# Patient Record
Sex: FEMALE | Race: WHITE | NOT HISPANIC OR LATINO | Employment: FULL TIME | URBAN - METROPOLITAN AREA
[De-identification: names, ages, dates, MRNs, and addresses within clinical notes are randomized per-mention and may not be internally consistent; named-entity substitution may affect disease eponyms.]

---

## 2012-12-22 LAB
COMPONENT (HISTORICAL CONVERSION): NORMAL
CROSSMATCH: NORMAL
STATUS (HISTORICAL CONVERSION): NORMAL
UNIT NUMBER: NORMAL

## 2018-09-18 ENCOUNTER — TELEPHONE (OUTPATIENT)
Dept: DERMATOLOGY | Facility: CLINIC | Age: 48
End: 2018-09-18

## 2018-09-18 NOTE — TELEPHONE ENCOUNTER
[facility] Call Center    Phone Message    May a detailed message be left on voicemail: yes    Reason for Call: Other: Pt is wanting to know if her referral to Dr. Allison has been received. She states her OB/Gyn, Dr. Lai, is referring her-however, she is also being referred by [facility]. She is being referred for hair loss. No documentation in chart-please verify if received, pt is anxious to schedule.    Action Taken: Message routed to:  [facility] ([facility] CC Derm Vasc

## 2018-10-18 ENCOUNTER — TRANSFERRED RECORDS (OUTPATIENT)
Dept: HEALTH INFORMATION MANAGEMENT | Facility: CLINIC | Age: 48
End: 2018-10-18

## 2019-01-02 ENCOUNTER — MEDICAL CORRESPONDENCE (OUTPATIENT)
Dept: HEALTH INFORMATION MANAGEMENT | Facility: CLINIC | Age: 49
End: 2019-01-02

## 2019-01-04 ENCOUNTER — PRE VISIT (OUTPATIENT)
Dept: DERMATOLOGY | Facility: CLINIC | Age: 49
End: 2019-01-04

## 2019-01-04 NOTE — TELEPHONE ENCOUNTER
FUTURE VISIT INFORMATION      FUTURE VISIT INFORMATION:    Date: N/A    Time:     Location:   REFERRAL INFORMATION:    Referring provider:  DR. EDY SIMMONS    Referring providers clinic:  [facility]    Reason for visit/diagnosis  HAIR LOSS      DATE RECEIVED: 1/3/2019   NOTES STATUS DETAILS   OFFICE NOTE from referring provider Received    OFFICE NOTE from other specialist N/A    SCALP BIOPSY RESULTS N/A    HAIR LOSS PHOTOS Received    LAB RESULTS N/A    MEDICATION LIST N/A      Action    Action Taken RECEIVED RECORDS WILL BE REVIEWED BY DR. TORRES WITHIN FOUR TO SIX WEEKS. PATIENT WILL BE CONTACTED.

## 2019-01-18 ENCOUNTER — HOSPITAL ENCOUNTER (OUTPATIENT)
Dept: MAMMOGRAPHY | Facility: CLINIC | Age: 49
Discharge: HOME | End: 2019-01-18
Attending: OBSTETRICS & GYNECOLOGY

## 2019-01-18 DIAGNOSIS — Z12.31 VISIT FOR SCREENING MAMMOGRAM: ICD-10-CM

## 2019-02-05 NOTE — TELEPHONE ENCOUNTER
Action    Action Taken RECORDS WERE REVIEWED AND PATIENT HAS BEEN ACCEPTED INTO DR. TORRES[facility]S [facility]. PATIENT MAY SCHEDULE AN [facility] APPOINTMENT.

## 2019-07-01 ENCOUNTER — OFFICE VISIT (OUTPATIENT)
Dept: DERMATOLOGY | Facility: CLINIC | Age: 49
End: 2019-07-01
Payer: COMMERCIAL

## 2019-07-01 VITALS — DIASTOLIC BLOOD PRESSURE: 59 MMHG | HEART RATE: 62 BPM | SYSTOLIC BLOOD PRESSURE: 116 MMHG

## 2019-07-01 DIAGNOSIS — Z15.89 MTHFR GENE MUTATION: ICD-10-CM

## 2019-07-01 DIAGNOSIS — Z90.710 HISTORY OF HYSTERECTOMY: ICD-10-CM

## 2019-07-01 DIAGNOSIS — L65.9 ALOPECIA: Primary | ICD-10-CM

## 2019-07-01 DIAGNOSIS — L65.9 ALOPECIA: ICD-10-CM

## 2019-07-01 DIAGNOSIS — Z86.718 HISTORY OF DEEP VEIN THROMBOSIS (DVT) OF LOWER EXTREMITY: ICD-10-CM

## 2019-07-01 DIAGNOSIS — Z98.890 H/O LAMINECTOMY: ICD-10-CM

## 2019-07-01 LAB
ALBUMIN SERPL-MCNC: 4 G/DL (ref 3.4–5)
ALP SERPL-CCNC: 57 U/L (ref 40–150)
ALT SERPL W P-5'-P-CCNC: 41 U/L (ref 0–50)
ANION GAP SERPL CALCULATED.3IONS-SCNC: 6 MMOL/L (ref 3–14)
AST SERPL W P-5'-P-CCNC: 26 U/L (ref 0–45)
BASOPHILS # BLD AUTO: 0.1 10E9/L (ref 0–0.2)
BASOPHILS NFR BLD AUTO: 1.2 %
BILIRUB SERPL-MCNC: 0.4 MG/DL (ref 0.2–1.3)
BUN SERPL-MCNC: 15 MG/DL (ref 7–30)
CALCIUM SERPL-MCNC: 8.8 MG/DL (ref 8.5–10.1)
CHLORIDE SERPL-SCNC: 105 MMOL/L (ref 94–109)
CO2 SERPL-SCNC: 26 MMOL/L (ref 20–32)
CREAT SERPL-MCNC: 0.7 MG/DL (ref 0.52–1.04)
DEPRECATED CALCIDIOL+CALCIFEROL SERPL-MC: 27 UG/L (ref 20–75)
DHEA-S SERPL-MCNC: 85 UG/DL (ref 35–430)
DIFFERENTIAL METHOD BLD: NORMAL
EOSINOPHIL # BLD AUTO: 0.2 10E9/L (ref 0–0.7)
EOSINOPHIL NFR BLD AUTO: 4.3 %
ERYTHROCYTE [DISTWIDTH] IN BLOOD BY AUTOMATED COUNT: 12.2 % (ref 10–15)
FERRITIN SERPL-MCNC: 64 NG/ML (ref 8–252)
GFR SERPL CREATININE-BSD FRML MDRD: >90 ML/MIN/{1.73_M2}
GLUCOSE SERPL-MCNC: 86 MG/DL (ref 70–99)
HCT VFR BLD AUTO: 41.1 % (ref 35–47)
HGB BLD-MCNC: 13.3 G/DL (ref 11.7–15.7)
IMM GRANULOCYTES # BLD: 0 10E9/L (ref 0–0.4)
IMM GRANULOCYTES NFR BLD: 0.2 %
IRON SATN MFR SERPL: 20 % (ref 15–46)
IRON SERPL-MCNC: 62 UG/DL (ref 35–180)
LYMPHOCYTES # BLD AUTO: 1.9 10E9/L (ref 0.8–5.3)
LYMPHOCYTES NFR BLD AUTO: 36.1 %
MCH RBC QN AUTO: 30.4 PG (ref 26.5–33)
MCHC RBC AUTO-ENTMCNC: 32.4 G/DL (ref 31.5–36.5)
MCV RBC AUTO: 94 FL (ref 78–100)
MONOCYTES # BLD AUTO: 0.4 10E9/L (ref 0–1.3)
MONOCYTES NFR BLD AUTO: 7.8 %
NEUTROPHILS # BLD AUTO: 2.6 10E9/L (ref 1.6–8.3)
NEUTROPHILS NFR BLD AUTO: 50.4 %
NRBC # BLD AUTO: 0 10*3/UL
NRBC BLD AUTO-RTO: 0 /100
PLATELET # BLD AUTO: 188 10E9/L (ref 150–450)
POTASSIUM SERPL-SCNC: 4.1 MMOL/L (ref 3.4–5.3)
PROT SERPL-MCNC: 7.2 G/DL (ref 6.8–8.8)
RBC # BLD AUTO: 4.38 10E12/L (ref 3.8–5.2)
SODIUM SERPL-SCNC: 138 MMOL/L (ref 133–144)
TIBC SERPL-MCNC: 301 UG/DL (ref 240–430)
WBC # BLD AUTO: 5.1 10E9/L (ref 4–11)

## 2019-07-01 RX ORDER — KETOCONAZOLE 20 MG/ML
SHAMPOO TOPICAL
COMMUNITY
Start: 2018-10-18 | End: 2024-07-01

## 2019-07-01 RX ORDER — BETAMETHASONE DIPROPIONATE 0.5 MG/G
LOTION TOPICAL
COMMUNITY
Start: 2018-10-18 | End: 2019-10-28

## 2019-07-01 RX ORDER — IBUPROFEN 200 MG
200 TABLET ORAL
COMMUNITY
End: 2019-10-28

## 2019-07-01 RX ORDER — VALACYCLOVIR HYDROCHLORIDE 1 G/1
TABLET, FILM COATED ORAL
COMMUNITY
Start: 2015-08-18 | End: 2022-05-23

## 2019-07-01 ASSESSMENT — PAIN SCALES - GENERAL: PAINLEVEL: NO PAIN (0)

## 2019-07-01 NOTE — LETTER
7/1/2019       RE: Rajni Lott  [address]  [facility] [address]     Dear Colleague,    Thank you for referring your patient, Rajni Lott, to the [facility] at [facility]. Please see a copy of my visit note below.    [facility] Dermatology Note      Dermatology Problem List:  1. Non-cicatricial alopecia: consideration of telogen effluvium, androgenic allopecia, and sebbhoreic dermatitis.    Labs 7/1/19 including: CBC, CMP, Vitamin D, Testosterone free and total, DHEA-S and zinc    Recommend initiation of DHS Zinc shampoo with continuation of weekly ketoconazole shampoo    Can consider PBM therapy vs rogaine initiation pending above labs      Encounter Date: Jul 1, 2019    CC:   Chief Complaint   Patient presents with     Hair Loss     Rajni is here today to be seen for alopecia- Rajni was referred by Dr. Regan.          History of Present Illness:  Ms. Rajni Lott is a 48 year old female who presents in consultation from Dr. Regan for hair loss.     2 years ago noticed gradual thinning at the central, bitemporal and frontal scalp. Feels she's lost roughly 50% of her hair density. Noted more coming out in the shower and on the comb; generally long hairs. Consistent with hair care routine; uses all natural and additive free shampoo / conditioner. Hair texture changes as well; more coarse and tangles more easily. Did not note any scalp symptoms with onset. Now feels her scalp is more sensitive; two itchy bumps on the posterior scalp. If she delays washing or after work outs will note more burning. Presently supplementing with Jarasil, Biotin and vitamin d. Tried ketoconazole shampoo without improvement. Will also apply a little betamethasone lotion on the posterior scalp. She denies flaking, pruritus, nail changes, eyelash / eyebrow changes, or hirsutism. No oral lesions or pain. No hormonal or menopausal symptoms; notably s/p  hysterectomy w/o ooferectomy for post-partum hemorrhage 6 years prior.     Inframammary and elbow minimally pruritic papules previously described as psoriasis. Infrequent - once every couple months. These have preceded her hair loss by multiple years and occur infrequently. No lesions in theses areas today. Blotchy appearance to her skin on  her neck and bilateral cheeks; feels this potentially flares with burning on the posterior scalp particularly after work-outs. Morning stiffness - centered over the back in the area of her prior laminectomy. <30 mins of stiffness. No small joint symptoms. Has some burning of the scalp when coloring hair; applies sweet and low which helps.    Initially seen by Dr. Rambo Gutiérrez 05/2018. Labs including CECE, Zinc, Ferritin, Folate/B12, DHEA-S, testosterone free and total, SHBG and pt was placed on vitamin d supplementation with no further documentation of lab abnormality. Biopsy was taken and per follow-up showed normal hair on the left central frontal scalp. Was diagnosed with telogen effluvium and started vivscal, jarrosil and ketoconazole shampoo. She has continued these but hasn't noted significant improvement.    Past Medical History:   Patient Active Problem List   Diagnosis     History of hysterectomy     History of deep vein thrombosis (DVT) of lower extremity     MTHFR gene mutation (H)     H/O laminectomy       Social History:  Patient reports that she has never smoked. She has never used smokeless tobacco.   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    Family History:  History reviewed. No pertinent family history.      Medications:  Current Outpatient Medications   Medication Sig Dispense Refill     betamethasone dipropionate (DIPROSONE) 0.05 % external lotion        ibuprofen (ADVIL/MOTRIN) 200 MG tablet Take 200 mg by mouth       ketoconazole (NIZORAL) 2 % external shampoo         valACYclovir (VALTREX) 1000 mg tablet           No Known Allergies      Review of Systems:  -As per HPI  -Constitutional: Otherwise feeling well today, in usual state of health.  -HEENT: Patient denies nonhealing oral sores.  -MSK: low back AM stiffness in area of laminectomy.  -Skin: As above in HPI. No additional skin concerns.    Physical exam:  Vitals: /59 (BP Location: Right arm, Patient Position: Sitting, Cuff Size: Adult Regular)   Pulse 62   GEN: This is a well developed, well-nourished female in no acute distress, in a pleasant mood.    SKIN: Sun-exposed skin, which includes the head/face, neck, both arms, digits, and/or nails was examined.   -mild diffuse perifolicular scale  -general bitemporal thinning  -1.2 part width through anterior to vertex scalp  -1.2 cm left temporal part width; 1.1 right temporal part width; 1.1 posterior scalp part width (tighter along right temple and posterior scalp)  - Hair regrowth layers are robust   - 1st: 1-2cm   - 2nd: 3-4cm   - 3rd: 8-10cm   - frontal: 1-2cm  -No other lesions of concern on areas examined.     Impression/Plan:  1. Non-cicatricial alopecia: consideration of telogen effluvium, androgenic allopecia, seborrheic dermatitis. Will repeat baseline labs and attempt to obtain formal path from prior punch biopsy. Given erythema with exercise w/ associated pruritus, recommending initiation of DHS zinc shampoo with continuation of weekly ketoconazole.    Labs today including: CBC, CMP, Vitamin D, Testosterone free and total, DHEA-S and zinc    Recommend initiation of DHS Zinc shampoo with continuation of weekly ketoconazole shampoo    Can consider PBM therapy pending above labs    CC MD [facility]  [address]  [place] [address] on close of this encounter.  Follow-up in 1 weeks with phone visit, earlier for new or changing lesions.      staffed the patient.    Staff Involved:  Resident(Anastacio  Dylan)/Staff    Patient was seen and examined with the dermatology resident. I agree with the history, review of systems, physical examination, assessments and plan.    Ami Allison MD  Professor and  Chair  Department of Dermatology  [facility]                      Pictures were placed in Pt's chart today for future reference.      Again, thank you for allowing me to participate in the care of your patient.      Sincerely,    Ami Allison MD

## 2019-07-01 NOTE — NURSING NOTE
Dermatology Rooming Note    Rajni Lott's goals for this visit include:   Chief Complaint   Patient presents with     Hair Loss     Rajni is here today to be seen for alopecia- Rajni was referred by Dr. Regan.      Paulina Lindo DRE

## 2019-07-01 NOTE — PATIENT INSTRUCTIONS
We will draw labs today including a blood count, total protein, Vitamin D level, hormone levels, iron and ferritin level.  DHS Zinc shampoo. Use every other day and after work-outs.  Will discuss low level laser light treatment. Can additionally consider topical Rogaine depending on what we see on labs today.  We will also attempt to obtain results of the biopsy.  You will follow-up with a phone visit.    Photobiomodulation (Low Level Laser (Light) Therapy)      What is Photobiomodulation?     Photobiomodulation, also referred to as low level laser therapy,  is an emerging treatment for hair thinning in men and women, also known as pattern hair loss. The devices use light to stimulate the hair follicle.  The exact mechanism is still unknown but there is evidence for improvement with hair growth and density.      What types of devices are available?    Each patient has many different options devices depending on preference for shape, frequency of use and price point.      There is no study comparing these devices. However, most research available is on the Hairmax devices.    Below is a sample of some devices currently available. All are FDA cleared for androgenetic alopecia.    In general, the more laser lights the more expensive the device. However, this does not necessarily mean the device works better.      Pictorama Lasercomb and Band   Shape Comb or Band   Lang Comb ($199-$399), Band ($499-$799)   Contact Information [email or web address]  [phone]  [phone]              LaserCap Pro Capillus 82 iGrow Theradome   Shape Hat Baseball Cap Helmet Helmet   Lang $3,000 $799 $549 $895   Contact Information nprogress  [phone]  [email or web address]  [phone]  [email or web address]  [phone]  [email or web address]   [phone]         If you plan to buy a hair max device, please consider using the following coupon code as this will give you a discount and  also result in a donation from hair max to our medical students.     e      Last update:4/2/2018

## 2019-07-01 NOTE — PROGRESS NOTES
[facility] Dermatology Note      Dermatology Problem List:  1. Non-cicatricial alopecia: consideration of telogen effluvium, androgenic allopecia, and sebbhoreic dermatitis.    Labs 7/1/19 including: CBC, CMP, Vitamin D, Testosterone free and total, DHEA-S and zinc    Recommend initiation of DHS Zinc shampoo with continuation of weekly ketoconazole shampoo    Can consider PBM therapy vs rogaine initiation pending above labs      Encounter Date: Jul 1, 2019    CC:   Chief Complaint   Patient presents with     Hair Loss     Rajni is here today to be seen for alopecia- Rajni was referred by Dr. Regan.          History of Present Illness:  Ms. Rajni Lott is a 48 year old female who presents in consultation from Dr. Regan for hair loss.     2 years ago noticed gradual thinning at the central, bitemporal and frontal scalp. Feels she's lost roughly 50% of her hair density. Noted more coming out in the shower and on the comb; generally long hairs. Consistent with hair care routine; uses all natural and additive free shampoo / conditioner. Hair texture changes as well; more coarse and tangles more easily. Did not note any scalp symptoms with onset. Now feels her scalp is more sensitive; two itchy bumps on the posterior scalp. If she delays washing or after work outs will note more burning. Presently supplementing with Jarasil, Biotin and vitamin d. Tried ketoconazole shampoo without improvement. Will also apply a little betamethasone lotion on the posterior scalp. She denies flaking, pruritus, nail changes, eyelash / eyebrow changes, or hirsutism. No oral lesions or pain. No hormonal or menopausal symptoms; notably s/p hysterectomy w/o ooferectomy for post-partum hemorrhage 6 years prior.     Inframammary and elbow minimally pruritic papules previously described as psoriasis. Infrequent - once every couple months. These have preceded her hair loss by multiple years and occur infrequently. No lesions  in theses areas today. Blotchy appearance to her skin on  her neck and bilateral cheeks; feels this potentially flares with burning on the posterior scalp particularly after work-outs. Morning stiffness - centered over the back in the area of her prior laminectomy. <30 mins of stiffness. No small joint symptoms. Has some burning of the scalp when coloring hair; applies sweet and low which helps.    Initially seen by Dr. Rambo Gutiérrez 05/2018. Labs including CECE, Zinc, Ferritin, Folate/B12, DHEA-S, testosterone free and total, SHBG and pt was placed on vitamin d supplementation with no further documentation of lab abnormality. Biopsy was taken and per follow-up showed normal hair on the left central frontal scalp. Was diagnosed with telogen effluvium and started vivscal, jarrosil and ketoconazole shampoo. She has continued these but hasn't noted significant improvement.    Past Medical History:   Patient Active Problem List   Diagnosis     History of hysterectomy     History of deep vein thrombosis (DVT) of lower extremity     MTHFR gene mutation (H)     H/O laminectomy       Social History:  Patient reports that she has never smoked. She has never used smokeless tobacco.   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    Family History:  History reviewed. No pertinent family history.      Medications:  Current Outpatient Medications   Medication Sig Dispense Refill     betamethasone dipropionate (DIPROSONE) 0.05 % external lotion        ibuprofen (ADVIL/MOTRIN) 200 MG tablet Take 200 mg by mouth       ketoconazole (NIZORAL) 2 % external shampoo        valACYclovir (VALTREX) 1000 mg tablet           No Known Allergies      Review of Systems:  -As per HPI  -Constitutional: Otherwise feeling well today, in usual state of health.  -HEENT: Patient denies nonhealing oral sores.  -MSK: low back AM stiffness in area of  laminectomy.  -Skin: As above in HPI. No additional skin concerns.    Physical exam:  Vitals: /59 (BP Location: Right arm, Patient Position: Sitting, Cuff Size: Adult Regular)   Pulse 62   GEN: This is a well developed, well-nourished female in no acute distress, in a pleasant mood.    SKIN: Sun-exposed skin, which includes the head/face, neck, both arms, digits, and/or nails was examined.   -mild diffuse perifolicular scale  -general bitemporal thinning  -1.2 part width through anterior to vertex scalp  -1.2 cm left temporal part width; 1.1 right temporal part width; 1.1 posterior scalp part width (tighter along right temple and posterior scalp)  - Hair regrowth layers are robust   - 1st: 1-2cm   - 2nd: 3-4cm   - 3rd: 8-10cm   - frontal: 1-2cm  -No other lesions of concern on areas examined.     Impression/Plan:  1. Non-cicatricial alopecia: consideration of telogen effluvium, androgenic allopecia, seborrheic dermatitis. Will repeat baseline labs and attempt to obtain formal path from prior punch biopsy. Given erythema with exercise w/ associated pruritus, recommending initiation of DHS zinc shampoo with continuation of weekly ketoconazole.    Labs today including: CBC, CMP, Vitamin D, Testosterone free and total, DHEA-S and zinc    Recommend initiation of DHS Zinc shampoo with continuation of weekly ketoconazole shampoo    Can consider PBM therapy pending above labs    CC MD [facility]  [facility] [facility] [place] [address] on close of this encounter.  Follow-up in 1 weeks with phone visit, earlier for new or changing lesions.      staffed the patient.    Staff Involved:  Resident(Anastacio Richardson)/Staff    Patient was seen and examined with the dermatology resident. I agree with the history, review of systems, physical examination, assessments and plan.    Ami Allison MD  Professor and  Chair  Department of Dermatology  [facility]

## 2019-07-03 LAB
SHBG SERPL-SCNC: 30 NMOL/L (ref 30–135)
TESTOST FREE SERPL-MCNC: 0.13 NG/DL (ref 0.11–0.58)
TESTOST SERPL-MCNC: 7 NG/DL (ref 8–60)
ZINC SERPL-MCNC: 73.9 UG/DL (ref 60–120)

## 2019-07-29 ENCOUNTER — MYC MEDICAL ADVICE (OUTPATIENT)
Dept: DERMATOLOGY | Facility: CLINIC | Age: 49
End: 2019-07-29

## 2019-08-02 NOTE — TELEPHONE ENCOUNTER
Called to discuss rash with patient. Not present on initial evaluation and occurs for 1-2 days at most with resolution on application of topical steroid. Per discussion with Dr. Allison, cannot establish possible relationship to hair loss by provided photo alone and would recommend pt present with recurrence of rash for clinical evaluation and consideration of biopsy. Patient amenable to the above plan.    Ross Richardson MD  Internal Medicine - Dermatology PGY 1  [phone]

## 2019-08-04 ENCOUNTER — TELEPHONE (OUTPATIENT)
Dept: DERMATOLOGY | Facility: CLINIC | Age: 49
End: 2019-08-04

## 2019-08-04 NOTE — TELEPHONE ENCOUNTER
This afternoon Ms. Lott and I connected regarding her hair loss and the result of her lab studies which overall were within normal limits except for a slightly low testosterone level. We also discussed the rash she intermittently develops which responds to topical clobeatsol. We looked at the photo that was sent;unfortunately, Ms. Lott had to send again as only the path reports could be found. In regards to her hair loss, she noted that she has recently purchased a PBM device but does not yet have this.     Recommendations-  1. RTC when rash is active for a skin biopsy - need to rule out psoriasis, GA, other  2. Continue with good scalp care, begin light therapy and have at least 2 months in prior to her October visit.  3. We reviewed her normal lab results. Recommend she review the low testosterone level with her GYN doc. We also discussed repeating the test to insure accuracy.  4. Fleeting pink patches on the neck. These could reflect allergic contact dermatitis to a conditioner or shampoo. If persists, she should also RTC to show us and if this diagnosis is correct, then allergy testing would be reasonable.    Ami Allison MD

## 2019-10-24 ENCOUNTER — RECORDS - HEALTHEAST (OUTPATIENT)
Dept: LAB | Facility: HOSPITAL | Age: 49
End: 2019-10-24

## 2019-10-24 LAB — RUBV IGG SERPL QL IA: POSITIVE

## 2019-10-25 LAB
MUV IGG SER QL IA: POSITIVE
VZV IGG SER QL IA: POSITIVE

## 2019-10-28 ENCOUNTER — OFFICE VISIT (OUTPATIENT)
Dept: DERMATOLOGY | Facility: CLINIC | Age: 49
End: 2019-10-28
Payer: COMMERCIAL

## 2019-10-28 VITALS — SYSTOLIC BLOOD PRESSURE: 108 MMHG | DIASTOLIC BLOOD PRESSURE: 65 MMHG | HEART RATE: 66 BPM

## 2019-10-28 DIAGNOSIS — L21.9 DERMATITIS, SEBORRHEIC: ICD-10-CM

## 2019-10-28 DIAGNOSIS — R21 RASH: ICD-10-CM

## 2019-10-28 DIAGNOSIS — L65.9 LOSS OF HAIR: Primary | ICD-10-CM

## 2019-10-28 ASSESSMENT — PAIN SCALES - GENERAL
PAINLEVEL: NO PAIN (0)
PAINLEVEL: NO PAIN (0)

## 2019-10-28 NOTE — PATIENT INSTRUCTIONS
e                              Take 1,000 international unit(s) vitamin D once per day for your low Vit D.      DHEA-S shampoo     Wound Care After a Biopsy    What is a skin biopsy?  A skin biopsy allows the doctor to examine a very small piece of tissue under the microscope to determine the diagnosis and the best treatment for the skin condition. A local anesthetic (numbing medicine)  is injected with a very small needle into the skin area to be tested. A small piece of skin is taken from the area. Sometimes a suture (stitch) is used.     What are the risks of a skin biopsy?  I will experience scar, bleeding, swelling, pain, crusting and redness. I may experience incomplete removal or recurrence. Risks of this procedure are excessive bleeding, bruising, infection, nerve damage, numbness, thick (hypertrophic or keloidal) scar and non-diagnostic biopsy.    How should I care for my wound for the first 24 hours?    Keep the wound dry and covered for 24 hours    If it bleeds, hold direct pressure on the area for 15 minutes. If bleeding does not stop then go to the emergency room    Avoid strenuous exercise the first 1-2 days or as your doctor instructs you    How should I care for the wound after 24 hours?    After 24 hours, remove the bandage    You may bathe or shower as normal    If you had a scalp biopsy, you can shampoo as usual and can use shower water to clean the biopsy site daily    Clean the wound twice a day with gentle soap and water    Do not scrub, be gentle    Apply white petroleum/Vaseline after cleaning the wound with a cotton swab or a clean finger, and keep the site covered with a Bandaid /bandage. Bandages are not necessary with a scalp biopsy    If you are unable to cover the site with a Bandaid /bandage, re-apply ointment 2-3 times a day to keep the site moist. Moisture will help with healing    Avoid strenuous activity for first 1-2 days    Avoid lakes, rivers, pools, and oceans until the  stitches are removed or the site is healed    How do I clean my wound?    Wash hands thoroughly with soap or use hand  before all wound care    Clean the wound with gentle soap and water    Apply white petroleum/Vaseline  to wound after it is clean    Replace the Bandaid /bandage to keep the wound covered for the first few days or as instructed by your doctor    If you had a scalp biopsy, warm shower water to the area on a daily basis should suffice    What should I use to clean my wound?     Cotton-tipped applicators (Qtips )    White petroleum jelly (Vaseline ). Use a clean new container and use Q-tips to apply.    Bandaids   as needed    Gentle soap     How should I care for my wound long term?    Do not get your wound dirty    Keep up with wound care for one week or until the area is healed.    A small scab will form and fall off by itself when the area is completely healed. The area will be red and will become pink in color as it heals. Sun protection is very important for how your scar will turn out. Sunscreen with an SPF 30 or greater is recommended once the area is healed.    If you have stitches, stitches need to be removed in 10-14 days. You may return to our clinic for this or you may have it done locally at your doctor s office.    You should have some soreness but it should be mild and slowly go away over several days. Talk to your doctor about using tylenol for pain,    When should I call my doctor?  If you have increased:     Pain or swelling    Pus or drainage (clear or slightly yellow drainage is ok)    Temperature over 100F    Spreading redness or warmth around wound    When will I hear about my results?  The biopsy results can take 2-3 weeks to come back. The clinic will call you with the results, send you a Ideal Me message, or have you schedule a follow-up clinic or phone time to discuss the results. Contact our clinics if you do not hear from us in 3 weeks.     Who should I call with  questions?    [facility]: [phone]     [facility]: [phone]    For urgent needs outside of business hours call the [facility] at [phone] and ask for the dermatology resident on call

## 2019-10-28 NOTE — PROGRESS NOTES
[facility] Dermatology Note      Dermatology Problem List:  1. Non-cicatricial alopecia: consideration of telogen effluvium, androgenic alopecia, associated with sebbhoreic dermatitis.  - repeat biopsy 10/28/19 - pending  - Labs 7/1/19 including: CBC, CMP, Vitamin D, Testosterone free and total, DHEA-S and zinc - WNL with slightly low testosterone (7)  - Current tx: DHS zinc shampoo, ketoconazole shampoo, LLLT      Encounter Date: Oct 28, 2019    CC:   Chief Complaint   Patient presents with     Hair Loss     Non-scarring alopecia - Rajni notes no change since her last visit.         History of Present Illness:  Ms. Rajni Lott is a 48 year old female who presents as a follow up for hair loss. She was last seen on 7/1/2019 when it was recommended the patient start DHS zinc shampoo along with continuing to use 2% ketoconazole shampoo. She had multiple labs checked at this visit, which were all normal, with the exception of slightly low testosterone. The patient was advised to discuss this low testosterone level with her GYN.     Today she reports no change with her hair loss. She notices it the most on the top of her scalp, near the part of her hair. She notices short regrowth hairs around her temples, but reports these have been around for months and do not seem to be growing longer. She is currently using a laser comb, 5,000IU Vitamin D supplement, ketoconazole shampoo once weekly, and DHS zinc shampoo every other day. She reports both of these shampoos are harsh on her hair and have left it feeling dry, even with using conditioner. She denies scalp itching, burning, or pain today, but reports her scalp will sometimes burn after working out. She dyes her hair every 2-3 months, and last did so about 8 weeks ago.     She brings in photos of the rash she has reported at previous visits. One shows flushing on the malar cheeks, which the patient reports will occur occasionally without any clear  trigger. She also shows photos of her elbows, which also occasionally get red patches that itch. A photo of the elbows shows a cluster of red papules coalescing into what looks like a plaque. She uses clobetasol ointment on the areas when they appear, which alleviate the itching and redness. These patches last for about a week. She reports no family history of psoriasis.     The patient reports she has seen her gynecologist since the last visit. She was referred to receive pellet injections, as her gyn did not recommend gels. She has not received any injections yet, and has not decided if she will pursue this treatment. No other concerns.    Past Medical History:   Patient Active Problem List   Diagnosis     History of hysterectomy     History of deep vein thrombosis (DVT) of lower extremity     MTHFR gene mutation (H)     H/O laminectomy       Social History:  Patient reports that she has never smoked. She has never used smokeless tobacco.   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    Family History:  Family History   Problem Relation Age of Onset     Melanoma No family hx of      Skin Cancer No family hx of          Medications:  Current Outpatient Medications   Medication Sig Dispense Refill     ketoconazole (NIZORAL) 2 % external shampoo        valACYclovir (VALTREX) 1000 mg tablet           No Known Allergies      Review of Systems:  -As per HPI  -Constitutional: Otherwise feeling well today, in usual state of health.  -Skin: As above in HPI. No additional skin concerns.    Physical exam:  Vitals: /65   Pulse 66   GEN: This is a well developed, well-nourished female in no acute distress, in a pleasant mood.    SKIN: Focused examination of the bilateral elbows, scalp, and face was performed.  - robust 1-2 cm regrowth fibers along bilateral temples  - perifollicular scale  - perifollicular accentuation  -  decreased hair density bitemporally  - hair pull test negative  - Part Width: 1.1  - eyebrows and eyelashes of normal density  - bilateral elbows   -No other lesions of concern on areas examined.     Impression/Plan:  1. Non-cicatricial alopecia: consideration of telogen effluvium, androgenic alopecia in association with  seborrheic dermatitis    Continue ketoconazole 2% shampoo weekly.     Continue DHS zinc shampoo every other day.    Continue using conditioner after shampooing.     Will discuss alternate therapies once biopsy results returned.    Continue LLLT.    Decrease vitamin D supplement. Discussed that 5,000 international unit(s) daily is more than the patient should be taking. Recommend around 1,000 international unit(s) daily.  Punch biopsy:  After discussion of benefits and risks including but not limited to bleeding/bruising, pain/swelling, infection, scar, incomplete removal, nerve damage/numbness, recurrence, and non-diagnostic biopsy, written consent, verbal consent and photographs were obtained. Time-out was performed. The area was cleaned with isopropyl alcohol. 0.5mL of 1% lidocaine with epinephrine was injected to obtain adequate anesthesia of the lesion on the left parietal scalp. Two 4 mm punch biopsies were performed.  4-0 prolene sutures were utilized to approximate the epidermal edges.  White petroleum jelly/VaselineTM and a bandage was applied to the wound.  Explicit verbal and written wound care instructions were provided.  The patient left the Dermatology Clinic in good condition. The patient was counseled to follow up for suture removal in approximately 10-14 days.    2. Rash, bilateral elbows - not seen today, based on patient reports and photos, consider contact dermatitis    If rash returns, instructed patient to take a photo and send to clinic. Patient should also record what she did for the 6 hours before she noticed the rash.      CC MD [facility]  [address]  [place] [address] on close of this encounter.      Follow-up in 4 months, earlier for new or changing lesions.      staffed the patient.    Staff Involved:  Resident(Candi Zelaya, PGY2)/Scribe/Staff    Scribe Disclosure  I, Bianka Lopez, am serving as a scribe to document services personally performed by Dr. Ami Allison MD, based on data collection and the provider's statements to me.    Provider Disclosure:   I agree with above History, Review of Systems, Physical exam and Plan. I have reviewed the content of the documentation and have edited it as needed. I have personally performed the services documented here and the documentation accurately represents those services and the decisions I have made.  Ms. Lott was also seen with the dermatology resident, Dr. Zelaya. I was present for the key portion of the biopsy procedure.    Ami Allison MD  Professor and Chair  Department of Dermatology  [facility]

## 2019-10-28 NOTE — NURSING NOTE
Dermatology Rooming Note    Rajni Lott's goals for this visit include:   Chief Complaint   Patient presents with     Hair Loss     Non-scarring alopecia - Rajni notes no change since her last visit.     Natalee Orozco, CMA

## 2019-10-28 NOTE — NURSING NOTE
Lidocaine-epinephrine 1-1:923756 % injection   1.5mL once for one use, starting 10/28/2019 ending 10/28/2019,  2mL disp, R-0, injection  Injected by JANINA White

## 2019-11-04 LAB — COPATH REPORT: NORMAL

## 2019-11-12 ENCOUNTER — COMMUNICATION - HEALTHEAST (OUTPATIENT)
Dept: PHYSICAL MEDICINE AND REHAB | Facility: CLINIC | Age: 49
End: 2019-11-12

## 2019-11-13 ENCOUNTER — HOSPITAL ENCOUNTER (OUTPATIENT)
Dept: PHYSICAL MEDICINE AND REHAB | Facility: CLINIC | Age: 49
Discharge: HOME | End: 2019-11-13
Attending: PHYSICIAN ASSISTANT

## 2019-11-13 DIAGNOSIS — M54.50 ACUTE MIDLINE LOW BACK PAIN WITHOUT SCIATICA: ICD-10-CM

## 2019-11-13 DIAGNOSIS — Z98.890 HISTORY OF LUMBAR SURGERY: ICD-10-CM

## 2019-11-13 ASSESSMENT — MIFFLIN-ST. JEOR: SCORE: 1424.67

## 2019-11-25 ENCOUNTER — COMMUNICATION - HEALTHEAST (OUTPATIENT)
Dept: PHYSICAL MEDICINE AND REHAB | Facility: CLINIC | Age: 49
End: 2019-11-25

## 2019-12-18 ENCOUNTER — TELEPHONE (OUTPATIENT)
Dept: DERMATOLOGY | Facility: CLINIC | Age: 49
End: 2019-12-18

## 2019-12-18 NOTE — TELEPHONE ENCOUNTER
Prior Authorization Retail Medication Request    Medication/Dose: Cleocin T 1% solution  ICD code (if different than what is on RX):    Previously Tried and Failed:    Rationale:      Insurance Name:  Preferred one  Insurance ID:  [id number]       Pharmacy Information (if different than what is on RX)  Name:    Phone:

## 2019-12-18 NOTE — TELEPHONE ENCOUNTER
Central Prior Authorization Team   Phone: [phone]      PA Initiation via fax    Medication: Cleocin T 1% external solution  Insurance Company: [employer] One - Phone [phone] Fax [phone]  Pharmacy Filling the Rx: [place], [place] - [address]  Filling Pharmacy Phone: [phone]  Filling Pharmacy Fax:    Start Date: 12/18/2019

## 2019-12-27 NOTE — TELEPHONE ENCOUNTER
Prior Authorization Approval    Authorization Effective Date: 12/17/2019  Authorization Expiration Date: 12/17/2020  Medication: Cleocin T 1% external solution- APPROVED  Approved Dose/Quantity:   Reference #:     Insurance Company: [employer] - Phone [phone] Fax [phone]  Expected CoPay:       CoPay Card Available:      Foundation Assistance Needed:    Which Pharmacy is filling the prescription (Not needed for infusion/clinic administered): [facility] [place], [place] - [address] [place] [address]  Pharmacy Notified: Yes-Pharmacy will notify patient when ready.  Patient Notified: No

## 2020-02-17 ENCOUNTER — TELEPHONE (OUTPATIENT)
Dept: DERMATOLOGY | Facility: CLINIC | Age: 50
End: 2020-02-17

## 2020-02-17 NOTE — TELEPHONE ENCOUNTER
[facility] Call Center    Phone Message    May a detailed message be left on voicemail: yes     Reason for Call: Medication Question or concern regarding medication   Prescription Clarification  Name of Medication: clindamycin (CLEOCIN T) 1 % external solution    Prescribing Provider: Ami Allison MD   Pharmacy: [address]   What on the order needs clarification? Pt wants to know how long she should continue use of clindamycin          Action Taken: Message routed to:  [facility] dermatology    Travel Screening: Not Applicable

## 2020-02-17 NOTE — TELEPHONE ENCOUNTER
[facility] Call Center    Phone Message    May a detailed message be left on voicemail: yes     Reason for Call: Other: Pt is still trying to reschedule the cancelled appt from 3/3/2020.  We  have been trying and missing each other to reschedule to an appt she can be at.    Can Ab help us create a slot for an appt for this Pt again?  Pt has left her direct phone line [phone] but says she will be in and out of surgery all day 2/18 and will still be difficult to reach.    Action Taken: Message routed to:  [facility] dermatology    Travel Screening: Not Applicable

## 2020-02-18 NOTE — TELEPHONE ENCOUNTER
I rescheduled Rajni for 3/9/20 @ 7:30. I sent her a [employer] [facility] message confirming the time and date.    JANINA Rollins

## 2020-02-27 ENCOUNTER — OFFICE VISIT - HEALTHEAST (OUTPATIENT)
Dept: FAMILY MEDICINE | Facility: CLINIC | Age: 50
End: 2020-02-27

## 2020-02-27 DIAGNOSIS — B02.9 HERPES ZOSTER WITHOUT COMPLICATION: ICD-10-CM

## 2020-03-02 ENCOUNTER — HEALTH MAINTENANCE LETTER (OUTPATIENT)
Age: 50
End: 2020-03-02

## 2020-03-04 ENCOUNTER — OFFICE VISIT (OUTPATIENT)
Dept: DERMATOLOGY | Facility: CLINIC | Age: 50
End: 2020-03-04
Payer: COMMERCIAL

## 2020-03-04 DIAGNOSIS — L30.9 DERMATITIS: Primary | ICD-10-CM

## 2020-03-04 RX ORDER — CLOBETASOL PROPIONATE 0.5 MG/G
OINTMENT TOPICAL
Qty: 90 G | Refills: 2 | Status: SHIPPED | OUTPATIENT
Start: 2020-03-04 | End: 2024-07-01

## 2020-03-04 RX ORDER — CLOBETASOL PROPIONATE 0.5 MG/G
OINTMENT TOPICAL 2 TIMES DAILY
COMMUNITY
End: 2024-07-01

## 2020-03-04 ASSESSMENT — PAIN SCALES - GENERAL: PAINLEVEL: NO PAIN (0)

## 2020-03-04 NOTE — LETTER
3/4/2020       RE: Rajni Lott  [address]  [place] [facility] [address]     Dear Colleague,    Thank you for referring your patient, Rajni Lott, to the [facility] DERMATOLOGY at [facility]. Please see a copy of my visit note below.    [facility] Dermatology Note    Dermatology Problem List:  1. Non-cicatricial alopecia: consideration of telogen effluvium, androgenic alopecia, associated with sebbhoreic dermatitis.  - repeat biopsy 10/28/19 - pending  - Labs 7/1/19 including: CBC, CMP, Vitamin D, Testosterone free and total, DHEA-S and zinc - WNL with slightly low testosterone (7)  - Current tx: DHS zinc shampoo, ketoconazole shampoo, LLLT  2. Pink, minimally scaly plaques on extremities. Ddx nummular dermatitis, ACD/ICD, less likely GA versus other.   - s/p punch bx 3/4/2020.  - clobetasol 0.05% ointment BID    Encounter Date: Mar 4, 2020    CC:  Chief Complaint   Patient presents with     Derm Problem     Rajni is here today for a rash on her body. Rajni has been treated for shingles-using clobetasol currently.      History of Present Illness:  Ms. Rajni Lott is a 49 year old female who presents for a rash. Last seen by Dr. Allison on 10/28/19 for hair loss.     Today, the patient has an itchy rash on the left elbow, right elbow and right shin and she states that is different than the rash Dr. Allison was previously following. Denies any blisters. She has been treating it with clobetasol BID with no improvement and is now on OTC moisturizers daily. She saw a PCP and was started on medication for shingles. Two months ago, she also had a dry, hive-like reaction on her arms and shoulders that lasted for several days which she treated with Zantac. Denies any new medications or contact with environmental irritants. Patient has a history of seasonal allergies. No family or personal history of rashes. Health otherwise stable. No other skin concerns.      Past Medical History:   Patient Active Problem List   Diagnosis     History of hysterectomy     History of deep vein thrombosis (DVT) of lower extremity     MTHFR gene mutation (H)     H/O laminectomy     No past medical history on file.  No past surgical history on file.    Social History:  Patient reports that she has never smoked. She has never used smokeless tobacco.    Family History:  Family History   Problem Relation Age of Onset     Melanoma No family hx of      Skin Cancer No family hx of        Medications:  Current Outpatient Medications   Medication Sig Dispense Refill     clindamycin (CLEOCIN T) 1 % external solution 2 ml to scalp daily 60 mL 3     clobetasol (TEMOVATE) 0.05 % external ointment Apply topically 2 times daily       ketoconazole (NIZORAL) 2 % external shampoo        valACYclovir (VALTREX) 1000 mg tablet          No Known Allergies    Review of Systems:  -Constitutional: Patient is otherwise feeling well, in usual state of health.   -Skin: As above in HPI. No additional skin concerns.    Physical exam:  Vitals: There were no vitals taken for this visit.  GEN: This is a well developed, well-nourished female in no acute distress, in a pleasant mood.    SKIN: Focused examination of the upper and lower extremities was performed.  - Pink, minimally scaly papules coalescing into plaques on the right knee, left medial elbow.   - No other lesions of concern on areas examined.     Impression/Plan:    1. Pink, minimally scaly plaques on extremities. Ddx nummular dermatitis, ACD/ICD, less likely GA versus other.     Continue clobetasol 0.05% ointment BID to rash on upper and lower extremities. Occlude with Saran wrap at night. Refill provided    PROCEDURE NOTE  Punch biopsy:  After discussion of benefits and risks with informed consent, the area was cleaned and anesthetized. A 4 mm punch biopsy was performed, the site sutured to approximate the epidermal edges and achieve hemostasis, and a white  petroleum jelly/VaselineTM and a bandage was applied to the wound.  Explicit verbal and written wound care instructions were provided.  The patient left the Dermatology Clinic in good condition.     Follow-up pending biopsy results , earlier for new or changing lesions.     Staff Involved:  Scribe/Staff    Scribe Disclosure  I, Che Aviles, am serving as a scribe to document services personally performed by Dr. Chester Swift MD, based on data collection and the provider's statements to me.     Provider Disclosure:   The documentation recorded by the scribe accurately reflects the services I personally performed and the decisions made by me.    Chester Swift MD    Department of Dermatology  [facility]: Phone: [phone], Fax:[phone]  [facility]: Phone: [phone] Fax: [phone]

## 2020-03-04 NOTE — NURSING NOTE
Lidocaine-epinephrine 1-1:852687 % injection   1.5mL once for one use, starting 3/4/2020 ending 3/4/2020,  2mL disp, R-0, injection  Injected by Dr. Swift

## 2020-03-04 NOTE — PROGRESS NOTES
[facility] Dermatology Note    Dermatology Problem List:  1. Non-cicatricial alopecia: consideration of telogen effluvium, androgenic alopecia, associated with sebbhoreic dermatitis.  - repeat biopsy 10/28/19 - pending  - Labs 7/1/19 including: CBC, CMP, Vitamin D, Testosterone free and total, DHEA-S and zinc - WNL with slightly low testosterone (7)  - Current tx: DHS zinc shampoo, ketoconazole shampoo, LLLT  2. Pink, minimally scaly plaques on extremities. Ddx nummular dermatitis, ACD/ICD, less likely GA versus other.   - s/p punch bx 3/4/2020.  - clobetasol 0.05% ointment BID    Encounter Date: Mar 4, 2020    CC:  Chief Complaint   Patient presents with     Derm Problem     Rajni is here today for a rash on her body. Rajni has been treated for shingles-using clobetasol currently.      History of Present Illness:  Ms. Rajni Lott is a 49 year old female who presents for a rash. Last seen by Dr. Allison on 10/28/19 for hair loss.     Today, the patient has an itchy rash on the left elbow, right elbow and right shin and she states that is different than the rash Dr. Allison was previously following. Denies any blisters. She has been treating it with clobetasol BID with no improvement and is now on OTC moisturizers daily. She saw a PCP and was started on medication for shingles. Two months ago, she also had a dry, hive-like reaction on her arms and shoulders that lasted for several days which she treated with Zantac. Denies any new medications or contact with environmental irritants. Patient has a history of seasonal allergies. No family or personal history of rashes. Health otherwise stable. No other skin concerns.     Past Medical History:   Patient Active Problem List   Diagnosis     History of hysterectomy     History of deep vein thrombosis (DVT) of lower extremity     MTHFR gene mutation (H)     H/O laminectomy     No past medical history on file.  No past surgical history on  file.    Social History:  Patient reports that she has never smoked. She has never used smokeless tobacco.    Family History:  Family History   Problem Relation Age of Onset     Melanoma No family hx of      Skin Cancer No family hx of        Medications:  Current Outpatient Medications   Medication Sig Dispense Refill     clindamycin (CLEOCIN T) 1 % external solution 2 ml to scalp daily 60 mL 3     clobetasol (TEMOVATE) 0.05 % external ointment Apply topically 2 times daily       ketoconazole (NIZORAL) 2 % external shampoo        valACYclovir (VALTREX) 1000 mg tablet          No Known Allergies    Review of Systems:  -Constitutional: Patient is otherwise feeling well, in usual state of health.   -Skin: As above in HPI. No additional skin concerns.    Physical exam:  Vitals: There were no vitals taken for this visit.  GEN: This is a well developed, well-nourished female in no acute distress, in a pleasant mood.    SKIN: Focused examination of the upper and lower extremities was performed.  - Pink, minimally scaly papules coalescing into plaques on the right knee, left medial elbow.   - No other lesions of concern on areas examined.     Impression/Plan:    1. Pink, minimally scaly plaques on extremities. Ddx nummular dermatitis, ACD/ICD, less likely GA versus other.     Continue clobetasol 0.05% ointment BID to rash on upper and lower extremities. Occlude with Saran wrap at night. Refill provided    PROCEDURE NOTE  Punch biopsy:  After discussion of benefits and risks with informed consent, the area was cleaned and anesthetized. A 4 mm punch biopsy was performed, the site sutured to approximate the epidermal edges and achieve hemostasis, and a white petroleum jelly/VaselineTM and a bandage was applied to the wound.  Explicit verbal and written wound care instructions were provided.  The patient left the Dermatology Clinic in good condition.     Follow-up pending biopsy results , earlier for new or changing lesions.      Staff Involved:  Scribe/Staff    Scribe Disclosure  I, Che Traci, am serving as a scribe to document services personally performed by Dr. Chester Swift MD, based on data collection and the provider's statements to me.     Provider Disclosure:   The documentation recorded by the scribe accurately reflects the services I personally performed and the decisions made by me.    Chester Swift MD    Department of Dermatology  [facility]: Phone: [phone], Fax:[phone]  [facility]: Phone: [phone] Fax: [phone]

## 2020-03-04 NOTE — NURSING NOTE
Chief Complaint   Patient presents with     Derm Problem     Rajni is here today for a rash on her body. Rajni has been treated for shingles-using clobetasol currently.      Michelle Castillo LPN

## 2020-03-04 NOTE — PATIENT INSTRUCTIONS

## 2020-03-09 ENCOUNTER — OFFICE VISIT (OUTPATIENT)
Dept: DERMATOLOGY | Facility: CLINIC | Age: 50
End: 2020-03-09
Payer: COMMERCIAL

## 2020-03-09 VITALS — SYSTOLIC BLOOD PRESSURE: 106 MMHG | HEART RATE: 56 BPM | DIASTOLIC BLOOD PRESSURE: 60 MMHG

## 2020-03-09 DIAGNOSIS — R89.9 ABNORMAL LABORATORY TEST RESULT: ICD-10-CM

## 2020-03-09 DIAGNOSIS — L21.9 DERMATITIS, SEBORRHEIC: ICD-10-CM

## 2020-03-09 DIAGNOSIS — L65.9 LOSS OF HAIR: Primary | ICD-10-CM

## 2020-03-09 ASSESSMENT — PAIN SCALES - GENERAL: PAINLEVEL: NO PAIN (0)

## 2020-03-09 NOTE — PROGRESS NOTES
"[facility] Dermatology Note    Dermatology Problem List:  1. 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 Pink plaques on extremities  Differential eczematous versus granulomatous  Biopsy 3/4/2019   -Clobetasol 0.05% ointment BID    Encounter Date: Mar 9, 2020    CC: hair loss    History of Present Illness:  Ms. Rajni Lott is a 49 year old female presenting for hair loss follow up  Spoke in detail with Dr. Allison regarding biopsy results and started clindamycin on the scalp 12/2019 and stopped LLLT until scalp is healthier  Everything has been stable, not worse, states that the areas that are most prominent are the temples; denies scalp burning or itching; overall health is good to stable; has been taking vitamin D supplementation  When she doesn't wash her hair, the second day it feels more \"flared\"  Feels so thin in the front, leaving it curly masks it slightly  States she sometimes gets inflamed slightly painful bumps on the posterior scalp and posterior neck  Has been using clindamycin all over her scalp and wondering how long she should continue  Takes collagen and shark fin supplement from [facility] for hair growth    Past Medical, Social, Family History:   Patient Active Problem List   Diagnosis     History of hysterectomy     History of deep vein thrombosis (DVT) of lower extremity     MTHFR gene mutation (H)     H/O laminectomy     No past medical history on file.  No past surgical history on file.    Family History   Problem Relation Age of Onset     Melanoma No family hx of      Skin Cancer No family hx of "        Social History  Patient  reports that she has never smoked. She has never used smokeless tobacco.    Current Outpatient Medications   Medication Sig Dispense Refill     clindamycin (CLEOCIN T) 1 % external solution 2 ml to scalp daily 60 mL 3     clobetasol (TEMOVATE) 0.05 % external ointment Apply topically 2 times daily       clobetasol (TEMOVATE) 0.05 % external ointment Apply twice daily as needed for rash on trunk and extremities. Do not apply to groin or face. 90 g 2     ketoconazole (NIZORAL) 2 % external shampoo        valACYclovir (VALTREX) 1000 mg tablet           Allergies  No Known Allergies    Review of Systems:  Constitutional: Otherwise feeling well today, in usual state of health.  HEENT: Patient denies nonhealing oral sores.    Physical exam:  Vitals: /60 (BP Location: Right arm, Patient Position: Sitting, Cuff Size: Adult Regular)   Pulse 56   General: In no acute distress, well-developed, well-nourished  Eyes: Conjunctivae clear  Pulmonary: Breathing comfortably in no distress  CV: Well-perfused, no cyanosis  Extremities: No deformity, no edema    Skin:   Martins II  -Face  -Neck  -Scalp     Robust 1-2 cm regrowth fibers along bilateral temples  Part width of 1.1-1.2 was present, vertex 1.1  - on the Dominguez scale  Frontal layer: 1-2 cm  Layer 1: 1-2 cm, robust  Layer 2: 3-4 cm, robust  Layer 3: 8 cm  Layer 4: 12-14 cm    -No erythema or scale  -Perifollicular accentuation present diffusely and worse on the temples  -Along frontal hair line, many terminal fibers that range from 1-3 cm  -Diffuse loss of hair density with thinning of the bitemporal scalp  -Eyebrows and eyelashes normal  -Hair pull test negative                    Impression/Plan:  1. 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 Allison      Staff Involved:  Resident/Staff    Danay Espinal MD  Dermatology Resident  [facility]    Patient was seen and examined with the dermatology resident. I agree with the history, review of systems, physical examination, assessments and plan. Over 50% of this 20 minute visit was spent reviewing results and options.    Ami Allison MD  Professor and  Chair  Department of Dermatology  [facility]

## 2020-03-09 NOTE — NURSING NOTE
Dermatology Rooming Note    Rajni Lott's goals for this visit include:   Chief Complaint   Patient presents with     Hair Loss     Rajni states that things have stayed the same     Dory Coles, EMT

## 2020-03-10 LAB — COPATH REPORT: NORMAL

## 2020-03-11 NOTE — RESULT ENCOUNTER NOTE
Could you call patient and let her know the spot we biopsied was consistent with an eczema-type reaction. This can either be intrinsic (e.g. due to your own body's immune system and skin biology) or extrinsic (due to outside exposures). I'd like her to continue the topical steroids.     We should make a follow-up for about 4 weeks to check on how things are going. If not improved, we would need to refer you for patch testing to r/o a contact dermatitis before pursuing other treatment options.     Thanks!    Chester Swift MD  Pronouns: he/him/his    Department of Dermatology  [facility]: Phone: [phone], Fax:[phone]  [facility]: Phone: [phone] Fax: [phone]

## 2020-07-10 ENCOUNTER — TELEPHONE (OUTPATIENT)
Dept: DERMATOLOGY | Facility: CLINIC | Age: 50
End: 2020-07-10

## 2020-07-10 NOTE — TELEPHONE ENCOUNTER
I called and left Rajni serrano  asking for them to give us a call back in regards to their upcoming appointment . Due to the COVID-19  virus we are reducing the traffic at [place] [facility] and asking patients do a virtual appointment which is either Video or Telephone. If patient would like to be seen in clinic we can reschedule for 4  months. Clinic number provided and notified that they can respond via [employer] if needed.

## 2020-07-15 NOTE — TELEPHONE ENCOUNTER
2nd attempted to reach [facility]. VM left and advised if we have not heard back from her by Friday we will cancel her appointment and she will need to reschedule.    JANINA Rollins

## 2020-10-07 ENCOUNTER — TELEPHONE (OUTPATIENT)
Dept: DERMATOLOGY | Facility: CLINIC | Age: 50
End: 2020-10-07

## 2020-10-07 NOTE — TELEPHONE ENCOUNTER
[facility] Call Center    Phone Message    May a detailed message be left on voicemail: yes     Reason for Call: Other: Pt called and would like for the provider or the care team to call the pt back. Pt said that she is losing a lot of hair and doesn't know what else to do. Please call. Thanks    Action Taken: Message routed to:  [facility] DERM    Travel Screening: Not Applicable

## 2020-10-08 NOTE — TELEPHONE ENCOUNTER
"Pt states she is losing \"tons\" of hair over the last couple months. Notes even when hair is dry stating it used to only happen when wet.    DHS zinc shampoo, clindamycin solution, ketoconazole shampoo, betamethasone ointment,     Never received steroid injections    Writer will discuss with Dr Allison and follow up with pt.  "

## 2020-10-26 ENCOUNTER — OFFICE VISIT (OUTPATIENT)
Dept: DERMATOLOGY | Facility: CLINIC | Age: 50
End: 2020-10-26
Payer: COMMERCIAL

## 2020-10-26 DIAGNOSIS — L65.9 LOSS OF HAIR: ICD-10-CM

## 2020-10-26 DIAGNOSIS — L65.9 LOSS OF HAIR: Primary | ICD-10-CM

## 2020-10-26 DIAGNOSIS — M25.572 PAIN IN JOINTS OF BOTH FEET: ICD-10-CM

## 2020-10-26 DIAGNOSIS — L30.9 DERMATITIS: ICD-10-CM

## 2020-10-26 DIAGNOSIS — M25.571 PAIN IN JOINTS OF BOTH FEET: ICD-10-CM

## 2020-10-26 DIAGNOSIS — R20.2 PARESTHESIAS: ICD-10-CM

## 2020-10-26 LAB
BASOPHILS # BLD AUTO: 0.1 10E9/L (ref 0–0.2)
BASOPHILS NFR BLD AUTO: 0.9 %
CRP SERPL-MCNC: <2.9 MG/L (ref 0–8)
DIFFERENTIAL METHOD BLD: NORMAL
EOSINOPHIL # BLD AUTO: 0.3 10E9/L (ref 0–0.7)
EOSINOPHIL NFR BLD AUTO: 4.8 %
ERYTHROCYTE [DISTWIDTH] IN BLOOD BY AUTOMATED COUNT: 12.2 % (ref 10–15)
ERYTHROCYTE [SEDIMENTATION RATE] IN BLOOD BY WESTERGREN METHOD: 7 MM/H (ref 0–20)
FERRITIN SERPL-MCNC: 83 NG/ML (ref 8–252)
HCT VFR BLD AUTO: 44 % (ref 35–47)
HGB BLD-MCNC: 14 G/DL (ref 11.7–15.7)
IMM GRANULOCYTES # BLD: 0 10E9/L (ref 0–0.4)
IMM GRANULOCYTES NFR BLD: 0.2 %
IRON SATN MFR SERPL: 31 % (ref 15–46)
IRON SERPL-MCNC: 97 UG/DL (ref 35–180)
LYMPHOCYTES # BLD AUTO: 1.9 10E9/L (ref 0.8–5.3)
LYMPHOCYTES NFR BLD AUTO: 35.5 %
MCH RBC QN AUTO: 29.9 PG (ref 26.5–33)
MCHC RBC AUTO-ENTMCNC: 31.8 G/DL (ref 31.5–36.5)
MCV RBC AUTO: 94 FL (ref 78–100)
MONOCYTES # BLD AUTO: 0.4 10E9/L (ref 0–1.3)
MONOCYTES NFR BLD AUTO: 7.7 %
NEUTROPHILS # BLD AUTO: 2.8 10E9/L (ref 1.6–8.3)
NEUTROPHILS NFR BLD AUTO: 50.9 %
NRBC # BLD AUTO: 0 10*3/UL
NRBC BLD AUTO-RTO: 0 /100
PLATELET # BLD AUTO: 247 10E9/L (ref 150–450)
RBC # BLD AUTO: 4.69 10E12/L (ref 3.8–5.2)
TIBC SERPL-MCNC: 312 UG/DL (ref 240–430)
TSH SERPL DL<=0.005 MIU/L-ACNC: 1.76 MU/L (ref 0.4–4)
WBC # BLD AUTO: 5.4 10E9/L (ref 4–11)

## 2020-10-26 PROCEDURE — 36415 COLL VENOUS BLD VENIPUNCTURE: CPT | Performed by: PATHOLOGY

## 2020-10-26 PROCEDURE — 85025 COMPLETE CBC W/AUTO DIFF WBC: CPT | Performed by: PATHOLOGY

## 2020-10-26 PROCEDURE — 85652 RBC SED RATE AUTOMATED: CPT | Performed by: PATHOLOGY

## 2020-10-26 PROCEDURE — 84443 ASSAY THYROID STIM HORMONE: CPT | Performed by: PATHOLOGY

## 2020-10-26 PROCEDURE — 99214 OFFICE O/P EST MOD 30 MIN: CPT | Performed by: DERMATOLOGY

## 2020-10-26 PROCEDURE — 86140 C-REACTIVE PROTEIN: CPT | Mod: 90 | Performed by: PATHOLOGY

## 2020-10-26 PROCEDURE — 82306 VITAMIN D 25 HYDROXY: CPT | Mod: 90 | Performed by: PATHOLOGY

## 2020-10-26 PROCEDURE — 83540 ASSAY OF IRON: CPT | Performed by: PATHOLOGY

## 2020-10-26 PROCEDURE — 86038 ANTINUCLEAR ANTIBODIES: CPT | Mod: 90 | Performed by: PATHOLOGY

## 2020-10-26 PROCEDURE — 82728 ASSAY OF FERRITIN: CPT | Performed by: PATHOLOGY

## 2020-10-26 PROCEDURE — 83550 IRON BINDING TEST: CPT | Performed by: PATHOLOGY

## 2020-10-26 PROCEDURE — 99000 SPECIMEN HANDLING OFFICE-LAB: CPT | Performed by: PATHOLOGY

## 2020-10-26 RX ORDER — CLOBETASOL PROPIONATE 0.05 G/100ML
SHAMPOO TOPICAL
Qty: 60 ML | Refills: 2 | Status: SHIPPED | OUTPATIENT
Start: 2020-10-26 | End: 2024-07-01

## 2020-10-26 NOTE — PATIENT INSTRUCTIONS
Continue DHS zinc shampoo, rotating with clobetasol shampoo     Continue Clindamycin solution daily, as needed    Continue betamethasone 0.05% external lotion     Start clobetasol shampoo 2-3x weekly, rotating with DHS zinc shampoo. Apply to the entire scalp when dry, leave on for 15 minutes before rinsing.    Start topical gabapentin 6% solution as needed    Disontinue Minoxidil foam daily    Discontinue ketoconazole shampoo     Discontinue Vitamin D supplementation, pending lab results.     Increase shampoo frequency to daily for a week or two.

## 2020-10-26 NOTE — PROGRESS NOTES
[facility] Dermatology Note    Dermatology Problem List:  1. 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 Pink plaques on extremities  -Eczematous versus granulomatous, s/p biopsy 3/4/2019   -Current Tx: Clobetasol 0.05% ointment as needed    Encounter Date: Oct 26, 2020    CC: Hair loss    History of Present Illness:  Ms. Rajni Lott is a 49 year old female who presents as a follow up for hair loss. The patient was last seen on 3/9/2020 when she continued DHS zinc shampoo, ketoconazole shampoo, clindamycin solution, minoxidil foam, and a vitamin D supplement.     Today, the patient reports increased hair loss, noting that her bed becomes full of hair and chunks of hair fall out after she showers. She has not noticed any differences in her eyebrows or eyelashes, although she does report that her nails do not grow a lot. Since the beginning of her hair loss diagnosis, she believes that she has lost about 50% of her hair density. Recently, she has had intermittent scalp pain, especially on the top and is painful to the touch. She has been using the clindamycin solution and clobetasol 0.05% ointment to help with the pain, but it always comes back. She used to experience irritation on the base of her neck, but it has now spread to the entire scalp with increased pain. She continues to use the DHS zinc shampoo and no change in conditioner. She washes her hair usually every other day and rarely uses other products. Her  last hair color was a few weeks ago and she used a sensitive color to avoid scalp irritation. She has not been going to the gym, so scalp sweat has not been a problem. She is not on an OCP, she had a hysterectomy.      Of note, the patient reports increased joint pain and arthritis in the feet, ankles, and back, but notices improvement once when she gets moving for the day.     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 No change in diet recently.     The eczematous patches on her extremeties continue to persist, she applies the clobetasol ointment when needed.       Past Medical, Social, Family History:   Patient Active Problem List   Diagnosis     History of hysterectomy     History of deep vein thrombosis (DVT) of lower extremity     MTHFR gene mutation (H)     H/O laminectomy     No past medical history on file.  No past surgical history on file.    Family History   Problem Relation Age of Onset     Melanoma No family hx of      Skin Cancer No family hx of    No history of arthritis in the family.     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     Current Outpatient Medications   Medication Sig Dispense Refill     clindamycin (CLEOCIN T) 1 % external solution 2 ml to scalp daily 60 mL 3     clobetasol (TEMOVATE) 0.05 % external ointment Apply topically 2 times daily       clobetasol (TEMOVATE) 0.05 % external ointment Apply twice daily as needed for rash on trunk and extremities. Do not apply to groin or face. 90 g 2     ketoconazole (NIZORAL) 2 % external shampoo        valACYclovir (VALTREX) 1000 mg tablet           Allergies  No Known Allergies    Review of Systems:  Constitutional: Otherwise feeling well today, in usual state of health.    Physical exam:  Vitals: There were no vitals taken for this visit.  GEN: This is a well developed, well-nourished female in no  acute distress, in a pleasant mood.   SKIN: Focused examination of the scalp, nails, and face was performed.  - Martins type: II   - No pitting of the nails  - Eyebrows and eyelashes intact   - Part width of 1.1 extending to the occipital scalp, no wiley tree pattern   - Layers of regrowth:    Layer 1: 2 cm, robust    Layer 2: 3 cm   Layer 3: 6-7 cm   Layer 4: 12-14 cm  - Fine, thin fibers   - Perifollicular scale  - Perifollicular accentuation on the right parietal scalp   - Decreased density of left parietal scalp compared to the top of the scalp   - Hair pull test negative    Impression/Plan:  1. 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 When compared to photographs on 3/9/2020, hair loss appears stable to improved in the temporal regions. Patient recently dyed her hair. Scalp is healthier than previous visits.      Continue DHS zinc shampoo, rotating with clobetasol shampoo     Continue Clindamycin solution daily, as needed    Continue betamethasone 0.05% external lotion     Start clobetasol shampoo 2-3x weekly, rotating with DHS zinc shampoo. Apply to the entire scalp when dry, leave on for 15 minutes before rinsing.     Start topical gabapentin 6% solution as needed    Increase shampoo frequency to daily for a week or two.    Discontinue ketoconazole shampoo. Patient has not been using.     Discontinue Vitamin D supplementation, pending lab results.     Disontinue Minoxidil foam daily    Labs obtained today: TSH with reflex T4, CBC with Diff, ferritin, Vitamin D, CECE, CRP, iron and iron binding, ESR    2. Pink plaques on extremities, DDx eczematous versus granulomatous, s/p biopsy 3/4/2019     Continue Clobetasol 0.05% ointment       Follow-up in 3 months, earlier for new or changing lesions.     Staff Involved:  Scribe/Staff    Scribe Disclosure  I, Pao Boyd am  serving as a scribe to document services personally performed by Dr. Ami Allison MD, based on data collection and the provider's statements to me.      Provider Disclosure:   The documentation recorded by the scribe accurately reflects the services I personally performed and the decisions made by me.    Ami Allison MD  Professor and Chair  Department of Dermatology  [facility]: Phone: [phone], Fax:[phone]  [facility]: Phone: [phone], Fax: [phone]

## 2020-10-26 NOTE — LETTER
10/26/2020       RE: Rajni Lott  [address]  [place] [facility] [address]     Dear Colleague,    Thank you for referring your patient, Rajni Lott, to the [facility]. Please see a copy of my visit note below.    [facility] Dermatology Note    Dermatology Problem List:  1. 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 Pink plaques on extremities  -Eczematous versus granulomatous, s/p biopsy 3/4/2019   -Current Tx: Clobetasol 0.05% ointment as needed    Encounter Date: Oct 26, 2020    CC: Hair loss    History of Present Illness:  Ms. Rajni Lott is a 49 year old female who presents as a follow up for hair loss. The patient was last seen on 3/9/2020 when she continued DHS zinc shampoo, ketoconazole shampoo, clindamycin solution, minoxidil foam, and a vitamin D supplement.     Today, the patient reports increased hair loss, noting that her bed becomes full of hair and chunks of hair fall out after she showers. She has not noticed any differences in her eyebrows or eyelashes, although she does report that her nails do not grow a lot. Since the beginning of her hair loss diagnosis, she believes that she has lost about 50% of her hair density. Recently, she has had intermittent scalp pain, especially on the top and is painful to the touch. She has been using the clindamycin solution and clobetasol 0.05% ointment to help with the  pain, but it always comes back. She used to experience irritation on the base of her neck, but it has now spread to the entire scalp with increased pain. She continues to use the DHS zinc shampoo and no change in conditioner. She washes her hair usually every other day and rarely uses other products. Her last hair color was a few weeks ago and she used a sensitive color to avoid scalp irritation. She has not been going to the gym, so scalp sweat has not been a problem. She is not on an OCP, she had a hysterectomy.      Of note, the patient reports increased joint pain and arthritis in the feet, ankles, and back, but notices improvement once when she gets moving for the day.     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 No change in diet recently.     The eczematous patches on her extremeties continue to persist, she applies the clobetasol ointment when needed.       Past Medical, Social, Family History:   Patient Active Problem List   Diagnosis     History of hysterectomy     History of deep vein thrombosis (DVT) of lower extremity     MTHFR gene mutation (H)     H/O laminectomy     No past medical history on file.  No past surgical history on file.    Family History   Problem Relation Age of Onset     Melanoma No family hx of      Skin Cancer No family hx of    No history of arthritis in the family.     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     Current Outpatient Medications   Medication Sig Dispense Refill     clindamycin (CLEOCIN T) 1 % external solution 2 ml to scalp daily 60 mL 3     clobetasol (TEMOVATE) 0.05 % external ointment Apply topically 2 times daily       clobetasol (TEMOVATE) 0.05 % external ointment Apply twice daily as needed for rash on trunk and extremities. Do not apply to groin or face. 90 g 2     ketoconazole (NIZORAL) 2 % external shampoo         valACYclovir (VALTREX) 1000 mg tablet           Allergies  No Known Allergies    Review of Systems:  Constitutional: Otherwise feeling well today, in usual state of health.    Physical exam:  Vitals: There were no vitals taken for this visit.  GEN: This is a well developed, well-nourished female in no acute distress, in a pleasant mood.   SKIN: Focused examination of the scalp, nails, and face was performed.  - Martins type: II   - No pitting of the nails  - Eyebrows and eyelashes intact   - Part width of 1.1 extending to the occipital scalp, no wiley tree pattern   - Layers of regrowth:    Layer 1: 2 cm, robust    Layer 2: 3 cm   Layer 3: 6-7 cm   Layer 4: 12-14 cm  - Fine, thin fibers   - Perifollicular scale  - Perifollicular accentuation on the right parietal scalp   - Decreased density of left parietal scalp compared to the top of the scalp   - Hair pull test negative    Impression/Plan:  1. 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 When compared to photographs on 3/9/2020, hair loss appears stable to improved in the temporal regions. Patient recently dyed her hair. Scalp is healthier than previous visits.      Continue DHS zinc shampoo, rotating with clobetasol shampoo     Continue Clindamycin solution daily, as needed    Continue betamethasone 0.05% external lotion     Start clobetasol shampoo 2-3x weekly, rotating with DHS zinc shampoo. Apply to the entire scalp when dry, leave on for 15 minutes before rinsing.     Start topical gabapentin 6% solution as needed    Increase shampoo frequency to daily for a week or two.    Discontinue ketoconazole shampoo. Patient has not been using.     Discontinue Vitamin D supplementation, pending lab results.     Disontinue Minoxidil foam daily    Labs obtained today: TSH with reflex T4, CBC with Diff, ferritin, Vitamin D, CECE, CRP, iron and  iron binding, ESR    2. Pink plaques on extremities, DDx eczematous versus granulomatous, s/p biopsy 3/4/2019     Continue Clobetasol 0.05% ointment       Follow-up in 3 months, earlier for new or changing lesions.     Staff Involved:  Scribe/Staff    Scribe Disclosure  I, Pao Deb, am serving as a scribe to document services personally performed by Dr. Ami Allison MD, based on data collection and the provider's statements to me.      Provider Disclosure:   The documentation recorded by the scribe accurately reflects the services I personally performed and the decisions made by me.    Ami Allison MD  Professor and Chair  Department of Dermatology  [facility]: Phone: [phone], Fax:[phone]  [facility]: Phone: [phone], Fax: [phone]          [employer] Summary (10/26/2020)    Frontal anterior    Midscalp    Vertex  1  Occipital    Right temporal    Left temporal        Again, thank you for allowing me to participate in the care of your patient.      Sincerely,    Ami Allison MD

## 2020-10-27 LAB
ANA SER QL IF: NEGATIVE
DEPRECATED CALCIDIOL+CALCIFEROL SERPL-MC: 26 UG/L (ref 20–75)

## 2020-10-30 NOTE — PROGRESS NOTES
[employer] Summary (10/26/2020)    Frontal anterior    Midscalp    Vertex  1  Occipital    Right temporal    Left temporal

## 2020-11-16 ENCOUNTER — AMBULATORY - HEALTHEAST (OUTPATIENT)
Dept: VASCULAR SURGERY | Facility: CLINIC | Age: 50
End: 2020-11-16

## 2020-11-16 ENCOUNTER — OFFICE VISIT - HEALTHEAST (OUTPATIENT)
Dept: LAB | Facility: CLINIC | Age: 50
End: 2020-11-16

## 2020-11-16 DIAGNOSIS — Z20.822 COVID-19 RULED OUT: ICD-10-CM

## 2020-11-18 ENCOUNTER — COMMUNICATION - HEALTHEAST (OUTPATIENT)
Dept: SCHEDULING | Facility: CLINIC | Age: 50
End: 2020-11-18

## 2020-12-14 ENCOUNTER — HEALTH MAINTENANCE LETTER (OUTPATIENT)
Age: 50
End: 2020-12-14

## 2021-02-08 ENCOUNTER — OFFICE VISIT (OUTPATIENT)
Dept: DERMATOLOGY | Facility: CLINIC | Age: 51
End: 2021-02-08
Payer: COMMERCIAL

## 2021-02-08 DIAGNOSIS — L30.9 DERMATITIS: ICD-10-CM

## 2021-02-08 DIAGNOSIS — L65.9 LOSS OF HAIR: Primary | ICD-10-CM

## 2021-02-08 PROCEDURE — 99214 OFFICE O/P EST MOD 30 MIN: CPT | Mod: GC | Performed by: DERMATOLOGY

## 2021-02-08 ASSESSMENT — PAIN SCALES - GENERAL: PAINLEVEL: NO PAIN (0)

## 2021-02-08 NOTE — NURSING NOTE
Dermatology Rooming Note    Rajni Lott's goals for this visit include:   Chief Complaint   Patient presents with     Hair Loss     Rajni is here today for a 4 month hair loss follow up      JANINA Rollins

## 2021-02-08 NOTE — PROGRESS NOTES
[facility] Dermatology Note  Encounter Date: Feb 8, 2021  Office Visit     Dermatology Problem List:  1. 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 Eczematous dermatitis on extremities, s/p biopsy 3/4/2020  -Current Tx: Clobetasol 0.05% ointment as needed    ____________________________________________    Assessment & Plan:     1. 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 When compared to photographs on 3/9/2020, hair loss appears stable to improved in the temporal regions. Patient recently dyed her hair. Scalp is healthier than previous visits.    ? Continue DHS zinc shampoo   ? Continue Clindamycin solution daily, as needed  ? Continue betamethasone 0.05% external lotion  ? Will consider restarting growth promoting treatments after patch testing    2. Eczematous dermatitis on extremities, s/p biopsy 3/4/2020   ? Continue Clobetasol 0.05% ointment prn  ? Referral to allergy for patch testing    Procedures Performed:   None    Follow-up: 6 month(s) in-person, or earlier for new or changing lesions    Staff and Resident:     Steven Hagen MD  Dermatology Resident    Patient was seen and examined with the dermatology resident. I agree with the history, review of systems, physical examination, assessments and plan.    Ami Allison MD  Professor and  Chair  Department of  Dermatology  [facility]    ____________________________________________    CC: No chief complaint on file.    HPI:  Ms. Rajni Lott is a(n) 50 year old female who presents today as a return patient for nonscarring alopecia. She continues zinc shampoo only. She continues betamethasone lotion. She did not  gabapentin solution because relative lack of symptoms. She is rarely using clindamycin lotion. She overall feels that things are going well. She denies hair irritation or burning or itching. She denies new meds/supplements.     She reports worsening eczema on arms and legs, also having irritation with various deodorants. Also has scalp irritation for a few days after dying hair.    Patient is otherwise feeling well, without additional concerns.    Labs:  N/A    Physical Exam:  Vitals: There were no vitals taken for this visit.  SKIN: Focused examination of scalp and face was performed.  - Eyebrows and eyelashes intact   - Part width of 1.1 extending to the occipital scalp, no wiley tree pattern  - Layers of regrowth:               Layer 1: 1-2 cm, scattered              Layer 2: 3-4 cm, robust              Layer 3: 6 cm              Layer 4: 10-12 cm  - Mild perifollicular scale  - Patchy perifollicular erythema  - Hair pull test negative  - No other lesions of concern on areas examined.     Medications:  Current Outpatient Medications   Medication     clindamycin (CLEOCIN T) 1 % external solution     clobetasol (TEMOVATE) 0.05 % external ointment     clobetasol (TEMOVATE) 0.05 % external ointment     clobetasol propionate (CLOBEX) 0.05 % external shampoo     gabapentin 6 % SOLN solution     ketoconazole (NIZORAL) 2 % external shampoo     valACYclovir (VALTREX) 1000 mg tablet     No current facility-administered medications for this visit.       Past Medical History:   Patient Active Problem List   Diagnosis     History of hysterectomy     History of deep vein thrombosis (DVT) of lower  extremity     MTHFR gene mutation (H)     H/O laminectomy     No past medical history on file.

## 2021-02-08 NOTE — PATIENT INSTRUCTIONS
Continue DHS zinc shampoo every 1-2 days and betamethasone lotion as needed  We referred you to patch testing.

## 2021-02-08 NOTE — LETTER
2/8/2021       RE: Rajni Lott  [address] [address]     Dear Colleague,    Thank you for referring your patient, Rajni Lott, to the [facility] DERMATOLOGY CLINIC [place] at [facility]. Please see a copy of my visit note below.    [facility] Dermatology Note  Encounter Date: Feb 8, 2021  Office Visit     Dermatology Problem List:  1. 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 Eczematous dermatitis on extremities, s/p biopsy 3/4/2020  -Current Tx: Clobetasol 0.05% ointment as needed    ____________________________________________    Assessment & Plan:     1. 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 When compared to photographs on 3/9/2020, hair loss appears stable to improved in the temporal regions. Patient recently dyed her hair. Scalp is healthier than previous visits.    ? Continue DHS zinc shampoo   ? Continue Clindamycin solution daily, as needed  ? Continue betamethasone 0.05% external lotion  ? Will consider restarting growth promoting treatments after patch testing    2. Eczematous dermatitis on extremities, s/p biopsy 3/4/2020   ? Continue Clobetasol 0.05% ointment prn  ? Referral to allergy for patch testing    Procedures Performed:   None    Follow-up: 6 month(s) in-person, or earlier for new or  changing lesions    Staff and Resident:     Steven Hagen MD  Dermatology Resident    Patient was seen and examined with the dermatology resident. I agree with the history, review of systems, physical examination, assessments and plan.    Ami Allison MD  Professor and  Chair  Department of Dermatology  [facility]    ____________________________________________    CC: No chief complaint on file.    HPI:  Ms. Rajni Lott is a(n) 50 year old female who presents today as a return patient for nonscarring alopecia. She continues zinc shampoo only. She continues betamethasone lotion. She did not  gabapentin solution because relative lack of symptoms. She is rarely using clindamycin lotion. She overall feels that things are going well. She denies hair irritation or burning or itching. She denies new meds/supplements.     She reports worsening eczema on arms and legs, also having irritation with various deodorants. Also has scalp irritation for a few days after dying hair.    Patient is otherwise feeling well, without additional concerns.    Labs:  N/A    Physical Exam:  Vitals: There were no vitals taken for this visit.  SKIN: Focused examination of scalp and face was performed.  - Eyebrows and eyelashes intact   - Part width of 1.1 extending to the occipital scalp, no wiley tree pattern  - Layers of regrowth:               Layer 1: 1-2 cm, scattered              Layer 2: 3-4 cm, robust              Layer 3: 6 cm              Layer 4: 10-12 cm  - Mild perifollicular scale  - Patchy perifollicular erythema  - Hair pull test negative  - No other lesions of concern on areas examined.     Medications:  Current Outpatient Medications   Medication     clindamycin (CLEOCIN T) 1 % external solution     clobetasol (TEMOVATE) 0.05 % external ointment     clobetasol (TEMOVATE) 0.05 % external ointment     clobetasol propionate (CLOBEX) 0.05 % external shampoo     gabapentin 6 % SOLN solution      ketoconazole (NIZORAL) 2 % external shampoo     valACYclovir (VALTREX) 1000 mg tablet     No current facility-administered medications for this visit.       Past Medical History:   Patient Active Problem List   Diagnosis     History of hysterectomy     History of deep vein thrombosis (DVT) of lower extremity     MTHFR gene mutation (H)     H/O laminectomy     No past medical history on file.      Again, thank you for allowing me to participate in the care of your patient.      Sincerely,    Ami Allison MD

## 2021-02-27 ENCOUNTER — HEALTH MAINTENANCE LETTER (OUTPATIENT)
Age: 51
End: 2021-02-27

## 2021-04-03 NOTE — TELEPHONE ENCOUNTER
FUTURE VISIT INFORMATION      FUTURE VISIT INFORMATION:    Date: 4.27.21    Time: 7:00    [place] [facility] [place]  REFERRAL INFORMATION:    Referring provider:  Dr. Ami Allison    Referring providers clinic:  [facility] Dermatology    Reason for visit/diagnosis  Patch testing consult per pt referral in [facility]     RECORDS REQUESTED FROM:       Clinic name Comments Records Status Photos Status   [facility] Derm 2.8.21, 10.26.20, 3.9.20  Dr. Allison    3.4.20  Dr. Swift [facility]

## 2021-04-18 ENCOUNTER — HEALTH MAINTENANCE LETTER (OUTPATIENT)
Age: 51
End: 2021-04-18

## 2021-04-27 ENCOUNTER — PRE VISIT (OUTPATIENT)
Dept: ALLERGY | Facility: CLINIC | Age: 51
End: 2021-04-27

## 2021-04-27 ENCOUNTER — VIRTUAL VISIT (OUTPATIENT)
Dept: ALLERGY | Facility: CLINIC | Age: 51
End: 2021-04-27
Attending: DERMATOLOGY
Payer: COMMERCIAL

## 2021-04-27 DIAGNOSIS — L30.9 DERMATITIS: Primary | ICD-10-CM

## 2021-04-27 PROCEDURE — 99214 OFFICE O/P EST MOD 30 MIN: CPT | Mod: 95 | Performed by: DERMATOLOGY

## 2021-04-27 RX ORDER — TACROLIMUS 1 MG/G
OINTMENT TOPICAL 2 TIMES DAILY
Qty: 60 G | Refills: 3 | Status: SHIPPED | OUTPATIENT
Start: 2021-04-27 | End: 2024-07-01

## 2021-04-27 NOTE — LETTER
4/27/2021         RE: Rajni Lott  [address] [facility] [address]        Dear Colleague,    Thank you for referring your patient, Rajni Lott, to the [facility] ALLERGY CLINIC [place]. Please see a copy of my visit note below.    [facility] Dermato-allergology Note  Virtual visit: store and forward video  Encounter Date: Apr 27, 2021  ____________________________________________    CC: No chief complaint on file.      HPI:  Ms. Rajni Lott is a(n) 50 year old female who presents today as a new patient for allergy consultation  - She describes a 1-2 year history of recurrent rashes on the elbows, knees arms and lateral breasts that are pruritic in nature but non tender and do not blister or ulcerate. She has clobetasol on hand for when this rash emerges but tries to use it very sparingly due to its high potency. She has no prior history of atopic dermatitis during childhood or asthma however over the past 5 years she has been experiencing more seasonal allergies. She describes itchy ears and throat and watery eyes randomly throughout the year, worse in the Spring and Fall. She takes Zyrtec and Pepcid to treat these allergies. She is also being treated for hair loss with Dr. Allison and denies any type of rash on the scalp similar to the body. She does dye her hair but denies any regular use of styling products. She uses a Zinc shampoo, a paraben and sulfate free conditioner. She also denies any personal or family history of psoriasis. A biopsy of one of these skin plaques showed spongiotic dermatitis with differential including nummular or atopic dermatitis vs ACD.   - otherwise feeling well in usual state of health    Physical exam:  General: In no acute distress, well-developed, well-nourished  Eyes: no conjunctivitis  ENT: no signs of rhinitis   Pulmonary: no wheezing or coughing  Skin: Focused examination of the skin on test sites was performed = see test results  below  Telemedicine photographs reviewed (Date of images: 3/1/20. Image quality and interpretability: acceptable. Location of teledermatologist: [facility] [place]. Start time: 7:25 AM. End time: 7:41 AM)  - Well demarcated pink eczematous plaques on the elbows and knees    Past Medical History:   Patient Active Problem List   Diagnosis     History of hysterectomy     History of deep vein thrombosis (DVT) of lower extremity     MTHFR gene mutation (H)     H/O laminectomy     No past medical history on file.    Allergies:  No Known Allergies    Medications:  Current Outpatient Medications   Medication     clindamycin (CLEOCIN T) 1 % external solution     clobetasol (TEMOVATE) 0.05 % external ointment     clobetasol (TEMOVATE) 0.05 % external ointment     clobetasol propionate (CLOBEX) 0.05 % external shampoo     gabapentin 6 % SOLN solution     ketoconazole (NIZORAL) 2 % external shampoo     valACYclovir (VALTREX) 1000 mg tablet     No current facility-administered medications for this visit.        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    Family History:  Family History   Problem Relation Age of Onset     Melanoma No family hx of      Skin Cancer No family hx of        Previous Labs, Allergy Tests, Dermatopathology, Imaging:  3/4/20 Skin, left elbow, punch:   - Subtle features of spongiotic dermatitis - (see comment)     COMMENT:   These changes suggest a partially treated eczematous dermatitis.  The   presence of occasional Langerhans' cell   aggregates suggests allergic contact dermatitis, though the differential   diagnosis includes atopic, nummular   or other eczematous processes.  PAS stain is negative for fungal elements.     Referred By: Ami Allison MD  [address]  [place] [address]     Allergy Tests:  Past Allergy Test - NONE    Order for Future Allergy  Testing:    [] Outpatient  [] Inpatient: [facility]..../ Bed ....       Skin Atopy (atopic dermatitis) [] Yes   [x] No  Contact allergies:   [] Yes   [x] No  Urticaria/Angioedema  [] Yes   [x] No  Rhinitis/Sinusitis:   [] Yes   [x] No   [] seasonal [x] perennial            Allergic Asthma:   [] Yes   [x] No  Pets :  [] Yes   [x] No  which?......  Food Allergy:   [] Yes   [x] No  Drug allergies:    [] Yes   [x] No  Leg ulcers:   [] Yes   [x] No  Hand eczema:   [] Yes   [x] No   Leading hand:   [] R   [] L       [] Ambidextrous                        Order for PATCH TESTS  Reason for tests (suspected allergy): fragrance, hair products?  Known previous allergies: none  Standardized panels  [x] Standard panel (40 tests)  [x] Preservatives & Antimicrobials (31 tests)  [x] Emulsifiers & Additives (25 tests)   [x] Perfumes/Flavours & Plants (25 tests)  [x] Hairdresser panel (12 tests)  [] Rubber Chemicals (22 tests)  [] Plastics (26 tests)  [] Colorants/Dyes/Food additives (20 tests)  [] Metals (implants/dental) (24 tests)  [] Local anaesthetics/NSAIDs (13 tests)  [] Antibiotics & Antimycotics (14 tests)   [] Corticosteroids (15 tests)   [] Photopatch test (62 tests)   [] others: ...       [] Patient's own products: ...    DO NOT test if chemical or biological identity is unknown!     always ask from patient the product information and safety sheets (MSDS)       Order for PRICK TESTS    Reason for tests (suspected allergy): molds, dust mites?  Known previous allergies: none    Standardized prick panels  [x] Atopic panel (20 tests)  [] Pediatric Panel (12 tests)  [] Milk, Meat, Eggs, Grains (20 tests)   [] Dust, Epithelia, Feathers (10 tests)  [] Fish, Seafood, Shellfish (17 tests)  [] Nuts, Beans (14 tests)  [] Spice, Vegetable, Fruit (17 tests)  [] Others: ...      [] Patient's own products: ...    DO NOT test if chemical or biological identity is unknown!     always ask from patient the product information and safety sheets  (MSDS)     Standardized intradermal tests  [x] Penicillium notatum [x] Aspergillus fumigatus [x] House dust mites  [] Bee venom   [] Wasp venom !!Specific protocol with dilutions!!  [] Others: ...    [] Patient needs consultation with Allergy team (changes of tests may apply)  [] Tests discussed with Allergy team (can have direct appointment for allergy tests)     ________________________________    Assessment & Plan:    ==> Final Diagnosis:     # Recurrent dermatitis on extremities and scalp?  DDx: ACD vs atopic dermatitis  * chronic, active    # Suspicion for atopic predisposition with  - Perennial rhinoconjunctivitis, otitis and pharyngitis with seasonal aggravation in spring and fall  - Recurrent dermatitis  * chronic, active      ==> Treatment Plan:  - Begin applying Protopic ointment to active plaques Monday through Friday and clobetasol ointment Saturday through Sunday  - Will perform prick testing to assess for atopy   - Will also perform intradermal testing with molds and dust mites  - Will perform patch testing to evaluate for allergic contact dermatitis   - Patient advised to avoid taking any antihistamines for at least 5 days prior to testing    Staff and Resident:      Janel Guerra DO (PGY-2)  Dermatology Resident  [facility]    Staff Physician Comments:  I saw and evaluated the patient with the resident and I agree with the assessment and plan as documented in the resident's note.    Trace Marcos MD  Professor  Head of Dermato-Allergy Division  Department of Dermatology  [facility]      Follow-up in Derm-Allergy clinic in 4 weeks for above tests.    Start time: 7:15 AM  End time: 7:45 AM    I spent a total of 30 minutes with Rajni Lott. This time was spent counseling the patient and/or coordinating care, explaining the allergy tests or procedures,performing allergy tests and assessing the clinical relevance.        Again, thank you for allowing  me to participate in the care of your patient.        Sincerely,        Trace Marcos MD

## 2021-04-27 NOTE — PATIENT INSTRUCTIONS
Patch Testing Information  What are allergen patch tests?    The test is done to look for skin allergies that may be causing rashes and irritation.    A patch test is a way of identifying whether a substance has caused a delayed reaction with skin inflammation, such as contact eczema or delayed (after days) reactions to drugs.     We will use various types of test compounds, which may include common allergens you may come in contact with in daily life such as preservatives, fragrances or even drugs.     Most of the time we will use standardized, prefabricated test solutions. The choice of the substances and series tested will depend on your history of reactions. Sometimes we will test your own products as well.     In order to avoid severe toxic reactions we need detailed information or safety sheets for each of the test compounds.    The test panels are set up with a small amount of common substances that cause skin allergies. They are taped to your skin with a clear hypoallergenic bandage and reinforced hypoallergenic tape.    The substances are numbered, so it is easy to tell what is causing a skin reaction.  What do I need to do in preparation for the test?    Stop all systemic steroids 1 month prior to the testing.     Stop applying topical steroids to the test area one week prior to the test. It is to use them elsewhere throughout the testing process. If this is not possible then discuss options with the Allergy specialist.    Do not go sunbathing or tanning for one week prior to testing    It is okay to take antihistamines as normal.     Please wear dark colors the week of the test since we will write on you with a dark marker that may transfer and stain clothing or bedding.     Some medications can affect the reactions to allergens during the tests. Therefore reveal all the medications you take to the allergy team. The doctor will discuss the medications with you before the tests.     Eat how you normally  would.    Avoid the following:    You cannot get the test area wet during the entire test period. This means no bathing or swimming the entire test period.    No strenuous exercise that may cause sweating.    Do not scratch the test area, this can cause the allergen to spread and give us false positives.     Avoid exposure to UV irradiation. This means no tanning or UV treatment during the testing period.   What can I expect?    Patch testing is done over three appointments.   o The usual schedule is: Monday (Allergen patches are placed), Wednesday (Patches are removed and skin is examined by the MD), and Friday (Skin is examined by the MD)      If you are allergic, there will be an area of irritation where the test was placed.    Itching or burning at the test site might happen if you are allergic to the allergen.  Do not rub or scratch the test site since this may spread the allergen and possibly cause false positives. If itching or burning is not tolerable please contact the clinic.    The marker we draw on your back with ma take up to 5 weeks to wash off completely. Rubbing alcohol can help speed up this process.     Reactions can occur 1 to 2 weeks after the tests are applied. If this happens please take photos of the area and contact the clinic.  What should I do after the tests are placed?    Keep the area dry. No showering or getting the test area wet from the time we see you at your first visit until after your third appointment. If you get the test area wet you are washing off the test and we could get false negative reactions.    If you notice any of the test patches coming loose put on more tape to re-secure the test.    If the marker that is applied fades you can use a dark pen to tee around the panel sites.    Cover the test area when you are outside to avoid any sun exposure while the test is in place.     You can remove the tests only if there is a severe reaction (itch, pain, blistering). Please  report this to your doctor immediately. If you have to remove the tests please tee the locations of each test field with a grid so we can identify the allergen.  WHAT ARE THE POSSIBLE RISKS OF PATCH TESTS     If you are allergic to a compound tested you will develop a localized skin reaction similar to your previous reaction, this may take days to develop. These reactions include a formation of red, itchy skin lesions that could be about a centimeter with small vesicles or possibly even blisters. The lesions will fade over time, this may take weeks. The test might leave some skin pigmentation for a few months.     In rare cases a localized reaction to patch testing can become generalized.     The tests with your own products might have some risks because they are not standardized and unanticipated reactions could occur. We need as much information as possible to evaluate if your own products are safe to test and under what conditions. This has to be evaluated for each individual product.   Useful Contact Information    To contact your doctor you can either send a [employer] message or call [phone]    Address  o [address] [place] [address], [address]    If you develop any serious or adverse allergic reaction after office hours please seek immediate medical assistance at the nearest clinic, urgent care, or emergency room.

## 2021-04-27 NOTE — PROGRESS NOTES
[facility] Dermato-allergology Note  Virtual visit: store and forward video  Encounter Date: Apr 27, 2021  ____________________________________________    CC: No chief complaint on file.      HPI:  Ms. Rajni Lott is a(n) 50 year old female who presents today as a new patient for allergy consultation  - She describes a 1-2 year history of recurrent rashes on the elbows, knees arms and lateral breasts that are pruritic in nature but non tender and do not blister or ulcerate. She has clobetasol on hand for when this rash emerges but tries to use it very sparingly due to its high potency. She has no prior history of atopic dermatitis during childhood or asthma however over the past 5 years she has been experiencing more seasonal allergies. She describes itchy ears and throat and watery eyes randomly throughout the year, worse in the Spring and Fall. She takes Zyrtec and Pepcid to treat these allergies. She is also being treated for hair loss with Dr. Allison and denies any type of rash on the scalp similar to the body. She does dye her hair but denies any regular use of styling products. She uses a Zinc shampoo, a paraben and sulfate free conditioner. She also denies any personal or family history of psoriasis. A biopsy of one of these skin plaques showed spongiotic dermatitis with differential including nummular or atopic dermatitis vs ACD.   - otherwise feeling well in usual state of health    Physical exam:  General: In no acute distress, well-developed, well-nourished  Eyes: no conjunctivitis  ENT: no signs of rhinitis   Pulmonary: no wheezing or coughing  Skin: Focused examination of the skin on test sites was performed = see test results below  Telemedicine photographs reviewed (Date of images: 3/1/20. Image quality and interpretability: acceptable. Location of teledermatologist: [facility] [place]. Start time: 7:25 AM. End time: 7:41  AM)  - Well demarcated pink eczematous plaques on the elbows and knees    Past Medical History:   Patient Active Problem List   Diagnosis     History of hysterectomy     History of deep vein thrombosis (DVT) of lower extremity     MTHFR gene mutation (H)     H/O laminectomy     No past medical history on file.    Allergies:  No Known Allergies    Medications:  Current Outpatient Medications   Medication     clindamycin (CLEOCIN T) 1 % external solution     clobetasol (TEMOVATE) 0.05 % external ointment     clobetasol (TEMOVATE) 0.05 % external ointment     clobetasol propionate (CLOBEX) 0.05 % external shampoo     gabapentin 6 % SOLN solution     ketoconazole (NIZORAL) 2 % external shampoo     valACYclovir (VALTREX) 1000 mg tablet     No current facility-administered medications for this visit.        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    Family History:  Family History   Problem Relation Age of Onset     Melanoma No family hx of      Skin Cancer No family hx of        Previous Labs, Allergy Tests, Dermatopathology, Imaging:  3/4/20 Skin, left elbow, punch:   - Subtle features of spongiotic dermatitis - (see comment)     COMMENT:   These changes suggest a partially treated eczematous dermatitis.  The   presence of occasional Langerhans' cell   aggregates suggests allergic contact dermatitis, though the differential   diagnosis includes atopic, nummular   or other eczematous processes.  PAS stain is negative for fungal elements.     Referred By: Ami Allison MD  [address] [place] [address] [address]  [place] [address]     Allergy Tests:  Past Allergy Test - NONE    Order for Future Allergy Testing:    [] Outpatient  [] Inpatient: [facility]..../ Bed ....       Skin Atopy (atopic dermatitis) [] Yes   [x] No  Contact allergies:   [] Yes   [x] No  Urticaria/Angioedema  [] Yes   [x] No  Rhinitis/Sinusitis:   [] Yes   [x] No   [] seasonal [x] perennial             Allergic Asthma:   [] Yes   [x] No  Pets :  [] Yes   [x] No  which?......  Food Allergy:   [] Yes   [x] No  Drug allergies:    [] Yes   [x] No  Leg ulcers:   [] Yes   [x] No  Hand eczema:   [] Yes   [x] No   Leading hand:   [] R   [] L       [] Ambidextrous                        Order for PATCH TESTS  Reason for tests (suspected allergy): fragrance, hair products?  Known previous allergies: none  Standardized panels  [x] Standard panel (40 tests)  [x] Preservatives & Antimicrobials (31 tests)  [x] Emulsifiers & Additives (25 tests)   [x] Perfumes/Flavours & Plants (25 tests)  [x] Hairdresser panel (12 tests)  [] Rubber Chemicals (22 tests)  [] Plastics (26 tests)  [] Colorants/Dyes/Food additives (20 tests)  [] Metals (implants/dental) (24 tests)  [] Local anaesthetics/NSAIDs (13 tests)  [] Antibiotics & Antimycotics (14 tests)   [] Corticosteroids (15 tests)   [] Photopatch test (62 tests)   [] others: ...       [] Patient's own products: ...    DO NOT test if chemical or biological identity is unknown!     always ask from patient the product information and safety sheets (MSDS)       Order for PRICK TESTS    Reason for tests (suspected allergy): molds, dust mites?  Known previous allergies: none    Standardized prick panels  [x] Atopic panel (20 tests)  [] Pediatric Panel (12 tests)  [] Milk, Meat, Eggs, Grains (20 tests)   [] Dust, Epithelia, Feathers (10 tests)  [] Fish, Seafood, Shellfish (17 tests)  [] Nuts, Beans (14 tests)  [] Spice, Vegetable, Fruit (17 tests)  [] Others: ...      [] Patient's own products: ...    DO NOT test if chemical or biological identity is unknown!     always ask from patient the product information and safety sheets (MSDS)     Standardized intradermal tests  [x] Penicillium notatum [x] Aspergillus fumigatus [x] House dust mites  [] Bee venom   [] Wasp venom !!Specific protocol with dilutions!!  [] Others: ...    [] Patient needs consultation with Allergy team (changes of  tests may apply)  [] Tests discussed with Allergy team (can have direct appointment for allergy tests)     ________________________________    Assessment & Plan:    ==> Final Diagnosis:     # Recurrent dermatitis on extremities and scalp?  DDx: ACD vs atopic dermatitis  * chronic, active    # Suspicion for atopic predisposition with  - Perennial rhinoconjunctivitis, otitis and pharyngitis with seasonal aggravation in spring and fall  - Recurrent dermatitis  * chronic, active      ==> Treatment Plan:  - Begin applying Protopic ointment to active plaques Monday through Friday and clobetasol ointment Saturday through Sunday  - Will perform prick testing to assess for atopy   - Will also perform intradermal testing with molds and dust mites  - Will perform patch testing to evaluate for allergic contact dermatitis   - Patient advised to avoid taking any antihistamines for at least 5 days prior to testing    Staff and Resident:      Janel Guerra DO (PGY-2)  Dermatology Resident  [facility]    Staff Physician Comments:  I saw and evaluated the patient with the resident and I agree with the assessment and plan as documented in the resident's note.    Trace Marcos MD  Professor  Head of Dermato-Allergy Division  Department of Dermatology  [facility]      Follow-up in Derm-Allergy clinic in 4 weeks for above tests.    Start time: 7:15 AM  End time: 7:45 AM    I spent a total of 30 minutes with Rajni Lott. This time was spent counseling the patient and/or coordinating care, explaining the allergy tests or procedures,performing allergy tests and assessing the clinical relevance.

## 2021-05-27 ENCOUNTER — RECORDS - HEALTHEAST (OUTPATIENT)
Dept: ADMINISTRATIVE | Facility: CLINIC | Age: 51
End: 2021-05-27

## 2021-05-29 ENCOUNTER — RECORDS - HEALTHEAST (OUTPATIENT)
Dept: ADMINISTRATIVE | Facility: CLINIC | Age: 51
End: 2021-05-29

## 2021-06-01 ENCOUNTER — RECORDS - HEALTHEAST (OUTPATIENT)
Dept: ADMINISTRATIVE | Facility: CLINIC | Age: 51
End: 2021-06-01

## 2021-06-02 ENCOUNTER — RECORDS - HEALTHEAST (OUTPATIENT)
Dept: ADMINISTRATIVE | Facility: CLINIC | Age: 51
End: 2021-06-02

## 2021-06-03 ENCOUNTER — RECORDS - HEALTHEAST (OUTPATIENT)
Dept: ADMINISTRATIVE | Facility: CLINIC | Age: 51
End: 2021-06-03

## 2021-06-03 VITALS — HEIGHT: 67 IN | BODY MASS INDEX: 26.37 KG/M2 | WEIGHT: 168 LBS

## 2021-06-03 NOTE — TELEPHONE ENCOUNTER
Call to pt with provider's response. Transferred pt to scheduling to make appt with Brianne PHIPPS.

## 2021-06-03 NOTE — PROGRESS NOTES
"ASSESSMENT: Rajni Lott is a 48 y.o. female who is otherwise healthy who presents today for evaluation of acute centralized low back pain without radicular symptoms.  Pain began 2.5 days ago.  Patient has a history of a left L4-5 hemilaminectomy and microdiscectomy on September 3, 2015 with Dr. Gonzalez.  She has not had any postoperative imaging of the lumbar spine.  Patient states that she did very well after surgery with resolution of pain and only slight residual paresthesias, up until new pain began this week.  I am concerned she may have a disc bulge at L4-5 or L5-S1, possibly with an annular tear.  She is neurologically intact.  No red flags.    NAGI:44      PLAN:  A shared decision making model was used.  The patient's values and choices were respected.  The following represents what was discussed and decided upon by the physician assistant and the patient.      1.  DIAGNOSTIC TESTS: I reviewed the MRI lumbar spine from August 2015 (preoperative).  No further diagnostic tests were ordered.  If pain persist, recommend obtaining an updated MRI lumbar spine.    2.  PHYSICAL THERAPY: No physical therapy was ordered.  We will see how the patient does with oral steroids.  If pain remains significant in 2 weeks, I recommend a referral to physical therapy.  Patient did participate in physical therapy before her surgery.  She states that she still has the exercises.  I encouraged to do her home exercises on a regular basis.  She should also continue with her core strengthening exercises (Pilates).    3.  MEDICATIONS:    -I provided a prescription for a Medrol Dosepak.  Patient will bring this on her cruise.  She does not need to take it if her symptoms remain manageable.  She can take it in case symptoms worsen.  - Patient is not interested in taking gabapentin.  -Patient states she tried tramadol and Flexeril previously and she did not tolerate them well.  She states that she felt \"worked.\"    4.  " "INTERVENTIONS: No interventions were ordered.  Patient may benefit from interventional pain management if she fails to improve with conservative treatment, depending on the results of an updated MRI lumbar spine.    5.  PATIENT EDUCATION: Patient is in agreement the above plan.  All questions were answered.    6.  FOLLOW-UP:   A nurse will call the patient on November 25, 2019 to check in.  She will be back from her cruise then.  If she is doing better she can follow-up as needed.  If she continues to have significant pain I recommend an MRI lumbar spine, referral to physical therapy.  If she has any questions or concerns in the meantime, she should not hesitate to call.      SUBJECTIVE:  Rajni Lott  Is a 48 y.o. female who presents today for evaluation of acute low back pain.  Patient has a history of a left L4-5 hemilaminectomy microdiscectomy September 3, 2015 with Dr. Gonzalez.  Patient reports that she did very well after her surgery.  She states that she had resolution of her back and leg pain.  She does have some residual numbness and tingling down the left leg in an L5 distribution, but that has improved.  She states that she did well until this month.  She states that she had 1 or 2 episodes earlier this month of brief \"nerve pain\" in the lower back.  The past 2.5 days that pain has been more persistent.  She denies any injury or event to cause the pain.  Patient is concerned because she leaves for a cruise to the [facility] on Saturday.  She will be gone for 7 days.    Patient complains of centralized low back pain at the lumbosacral junction.  She describes this pain as a shocking pain.  She states that it comes and goes.  She cannot identify any triggers for the pain.  She states that sometimes it happens when she is transitioning in and out of bed, but not always.  Sometimes it happens with transitioning from a seated to standing position and vice versa, but not consistently.  It can occur when " she is just walking.  She states the pain lasts for 10 to 30 seconds and then resolves on its own.  She states that it is occurring a couple times per hour.  She reports that she had similar symptoms at the onset of her low back pain in  which ended up requiring surgery.  She denies any pain radiating in the buttocks or down the legs.  She denies any numbness, tingling, or weakness down the legs.  She denies loss of bowel or bladder control.  Denies any recent fevers, chills, sweats.    Patient has not had any recent treatment for her low back.  She has not had any physical therapy since before her surgery.  She states that she still has her home exercises.  She works on core strengthening regularly at the gym including Pilates.  She does not go to a chiropractor.  She has not had any injections after surgery.  Patient reports that she has tried using ibuprofen 800 mg since her pain began 2 days ago.  She is not sure if this is helping.    Current Outpatient Medications on File Prior to Encounter   Medication Sig Dispense Refill     ibuprofen (ADVIL,MOTRIN) 200 MG tablet Take 200 mg by mouth every 6 (six) hours as needed for pain.       valACYclovir (VALTREX) 1000 MG tablet            No Known Allergies    Past Medical History:   Diagnosis Date     DVT (deep venous thrombosis) (H)      Low back pain      MTHFR mutation (H)      PONV (postoperative nausea and vomiting)           Past Surgical History:   Procedure Laterality Date     BREAST BIOPSY Left     LIPOMA      SECTION       LUMBAR DISCECTOMY Left 9/3/2015    Procedure: LEFT L4 L5 HEMILAMINECTOMY MICRODISCECTOMY;  Surgeon: Dinora Gonzalez MD;  Location: [facility];  Service:      ND TOTAL ABDOM HYSTERECTOMY      Description: Hysterectomy;  Recorded: 2014;       Family History   Problem Relation Age of Onset     No Medical Problems Mother      No Medical Problems Father      No Medical Problems Sister      No Medical  Problems Brother      Breast cancer Maternal Aunt      No Medical Problems Maternal Uncle      No Medical Problems Paternal Aunt      No Medical Problems Paternal Uncle      No Medical Problems Maternal Grandmother      No Medical Problems Maternal Grandfather      No Medical Problems Paternal Grandmother      No Medical Problems Paternal Grandfather      Breast cancer Maternal Aunt        Social history: Patient is .  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  She drinks alcohol socially.  She denies tobacco use.  She denies illicit drug use.      ROS:    Specifically negative for bowel/bladder dysfunction, fevers,chills, appetite changes, unexplained weight loss.   Otherwise 13 systems reviewed are negative.  Please see the patient's intake questionnaire from today for details.      OBJECTIVE:  PHYSICAL EXAMINATION:    CONSTITUTIONAL:  Vital signs as above.  No acute distress.  The patient is well nourished and well groomed.  PSYCHIATRIC:  The patient is awake, alert, oriented to person, place, time and answering questions appropriately with clear speech.    HEENT: Normocephalic, atraumatic.  Sclera clear.  Neck is supple.  SKIN:  Skin over the face, bilateral lower extremities, and posterior torso is clean, dry, intact without rashes.    GAIT:  Gait is non-antalgic.  The patient is able to heel and toe walk without significant difficulty.    STANDING EXAMINATION: No significant tenderness palpation lumbar spinous processes.  No tenderness palpation bilateral lumbar paraspinous muscles.  Lumbar range of motion is full.  MUSCLE STRENGTH:  The patient has 5/5 strength for the bilateral hip flexors, knee flexors/extensors, ankle dorsiflexors/plantar flexors, great toe extensors, ankle evertors/invertors.  NEUROLOGICAL:  2/4 patellar, and achilles reflexes bilaterally.  Negative Babinski's bilaterally.  No ankle clonus bilaterally.  Subjective diminished  sensation left dorsal foot.  VASCULAR:  2/4 dorsalis pedis pulses bilaterally.  Bilateral lower extremities are warm.  There is no pitting edema of the bilateral lower extremities.  ABDOMINAL:  Soft, non-distended, non-tender throughout all quadrants.  No pulsatile mass palpated in the left lower quadrant.  LYMPH NODES:  No palpable or tender inguinal lymph nodes.  MUSCULOSKELETAL: Straight leg raise is negative bilaterally.  Patient does have reproduction of pain with transitioning on and off the exam table.  When this occurred she felt a shocking pain extend into the left buttock.    RESULTS: Reviewed the MRI lumbar spine from [facility] dated August 31, 2015.  This shows a left L4-5 paracentral disc extrusion with mass-effect on the left L5 nerve root.  Additionally there is moderate left foraminal stenosis L5-S1.

## 2021-06-03 NOTE — TELEPHONE ENCOUNTER
Follow-up call placed to pt after last appt with Brianne PHIPPS on 11/13/2019. Left message to return call.

## 2021-06-03 NOTE — TELEPHONE ENCOUNTER
Received call from pt to give status update. Pt last seen in OV by Dr. Ramirez on 3/7/2016. Pt has had TFESIs, facet joint injections, and SI joint injection in the past (2015).     Pt reports beginning yesterday she began to experience muscle spasms, lasting approximately 10 seconds each. These are localized to her low back, equal on both sides. She reports this is the same pain she was seen for previously.     Since it has been over 3 years, pt will need to be reevaluated so appropriate treatment recommendations could be given. Soonest 40 minute follow-up appt is 11/21. Will reach out to provider to see if there is sooner availability, as the pt states she is leaving town this weekend.     Ok to leave detailed message upon call back.

## 2021-06-04 ENCOUNTER — RECORDS - HEALTHEAST (OUTPATIENT)
Dept: ADMINISTRATIVE | Facility: CLINIC | Age: 51
End: 2021-06-04

## 2021-06-04 VITALS — HEART RATE: 67 BPM | SYSTOLIC BLOOD PRESSURE: 118 MMHG | DIASTOLIC BLOOD PRESSURE: 70 MMHG | OXYGEN SATURATION: 98 %

## 2021-06-06 NOTE — PROGRESS NOTES
Assessment/Plan:        1. Herpes zoster without complication  Exam findings were discussed and pathophysiology reviewed   Plan:   - valACYclovir (VALTREX) 1000 MG tablet; Take 1 tablet (1,000 mg total) by mouth 3 (three) times a day for 7 days.  Dispense: 21 tablet; Refill: 0   Close follow up if no significant change or improvement as anticipated.       At the conclusion of the encounter the plan of care, disposition and all questions were answered and reviewed, and the patient acknowledged understanding and was involved in the decision making regarding the overall care plan.            Subjective:    Patient ID:   Rajni Lott is a 49 y.o. female presenting with a blistering rash located on her left elbow starting 2 days ago and beginning of similar one on her right knee associated with pain and itching sensation. Thinking that its the new onset of shingles, has started taking 2 doses of Valtrex so far.   No prior history of shingles.       Review of Systems  Allergy: reviewed  General : negative  A complete 5 point review of systems was obtained and is negative other than what is stated in the HPI.      The following patient's history were reviewed and updated as appropriate:   She  has a past medical history of DVT (deep venous thrombosis) (H), Low back pain, MTHFR mutation (H), and PONV (postoperative nausea and vomiting)..      Outpatient Encounter Medications as of 2/27/2020   Medication Sig Dispense Refill     ibuprofen (ADVIL,MOTRIN) 200 MG tablet Take 200 mg by mouth every 6 (six) hours as needed for pain.       methylPREDNISolone (MEDROL, SHERMAN,) 4 mg tablet follow package directions 21 tablet 0     valACYclovir (VALTREX) 1000 MG tablet        No facility-administered encounter medications on file as of 2/27/2020.          Objective:   /70 (Patient Site: Right Arm, Patient Position: Sitting, Cuff Size: Adult Regular)   Pulse 67   SpO2 98%       Physical Exam  General exam: No distress  well-hydrated  Skin: erythrovesicular crops of rash on the medial elbow, a few crops of similar rash noted on the right knee.

## 2021-07-04 NOTE — PROGRESS NOTES
[facility] Dermato-allergology Note  Office visit  Encounter Date: Jul 5, 2021  ____________________________________________    CC: No chief complaint on file.      HPI:  Ms. Rajni Lott is a(n) 50 year old female who presents today as a return patient for allergy consultation  - Follow-up in Derm-Allergy clinic as prescribed in April 2021  - otherwise feeling well in usual state of health    Physical exam:  General: In no acute distress, well-developed, well-nourished  Eyes: no conjunctivitis  ENT: no signs of rhinitis   Pulmonary: no wheezing or coughing  Skin: Focused examination of the skin on test sites was performed = see test results below  No active eczematous skin lesions on tests sites, particularly back    Earlier History and Allergy exams:  - She describes a 1-2 year history of recurrent rashes on the elbows, knees arms and lateral breasts that are pruritic in nature but non tender and do not blister or ulcerate. She has clobetasol on hand for when this rash emerges but tries to use it very sparingly due to its high potency. She has no prior history of atopic dermatitis during childhood or asthma however over the past 5 years she has been experiencing more seasonal allergies. She describes itchy ears and throat and watery eyes randomly throughout the year, worse in the Spring and Fall. She takes Zyrtec and Pepcid to treat these allergies. She is also being treated for hair loss with Dr. Allison and denies any type of rash on the scalp similar to the body. She does dye her hair but denies any regular use of styling products. She uses a Zinc shampoo, a paraben and sulfate free conditioner. She also denies any personal or family history of psoriasis. A biopsy of one of these skin plaques showed spongiotic dermatitis with differential including nummular or atopic dermatitis vs ACD.   Telemedicine photographs reviewed (Date of images: 3/1/20. Image quality and interpretability:  acceptable. Location of teledermatologist: [facility] [place]. Start time: 7:25 AM. End time: 7:41 AM)  - Well demarcated pink eczematous plaques on the elbows and knees    Past Medical History:   Patient Active Problem List   Diagnosis     History of hysterectomy     History of deep vein thrombosis (DVT) of lower extremity     MTHFR gene mutation (H)     H/O laminectomy     Past Medical History:   Diagnosis Date     DVT (deep venous thrombosis) (H)      Low back pain      MTHFR mutation (H)      PONV (postoperative nausea and vomiting)        Allergies:  No Known Allergies    Medications:  Current Outpatient Medications   Medication     clindamycin (CLEOCIN T) 1 % external solution     clobetasol (TEMOVATE) 0.05 % external ointment     clobetasol (TEMOVATE) 0.05 % external ointment     clobetasol propionate (CLOBEX) 0.05 % external shampoo     gabapentin 6 % SOLN solution     ketoconazole (NIZORAL) 2 % external shampoo     tacrolimus (PROTOPIC) 0.1 % external ointment     valACYclovir (VALTREX) 1000 mg tablet     No current facility-administered medications for this visit.        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    Family History:  Family History   Problem Relation Age of Onset     Melanoma No family hx of      Skin Cancer No family hx of        Previous Labs, Allergy Tests, Dermatopathology, Imaging:  3/4/20 Skin, left elbow, punch:   - Subtle features of spongiotic dermatitis - (see comment)     COMMENT:   These changes suggest a partially treated eczematous dermatitis.  The   presence of occasional Langerhans' cell   aggregates suggests allergic contact dermatitis, though the differential   diagnosis includes atopic, nummular   or other eczematous processes.  PAS stain is negative for fungal elements.     Referred By: Ami Allison MD  [address]  [place]  [address]     Allergy Tests:  Past Allergy Test - NONE    Order for Future Allergy Testing:    [] Outpatient  [] Inpatient: [facility]..../ Bed ....       Skin Atopy (atopic dermatitis) [] Yes   [x] No  Contact allergies:   [] Yes   [x] No  Urticaria/Angioedema  [] Yes   [x] No  Rhinitis/Sinusitis:   [] Yes   [x] No   [] seasonal [x] perennial            Allergic Asthma:   [] Yes   [x] No  Pets :  [] Yes   [x] No  which?......  Food Allergy:   [] Yes   [x] No  Drug allergies:    [] Yes   [x] No  Leg ulcers:   [] Yes   [x] No  Hand eczema:   [] Yes   [x] No   Leading hand:   [] R   [] L       [] Ambidextrous                        Order for PATCH TESTS  Reason for tests (suspected allergy): fragrance, hair products?  Known previous allergies: none  Standardized panels  [x] Standard panel (40 tests)  [x] Preservatives & Antimicrobials (31 tests)  [x] Emulsifiers & Additives (25 tests)   [x] Perfumes/Flavours & Plants (25 tests)  [x] Hairdresser panel (12 tests)  [] Rubber Chemicals (22 tests)  [] Plastics (26 tests)  [] Colorants/Dyes/Food additives (20 tests)  [] Metals (implants/dental) (24 tests)  [] Local anaesthetics/NSAIDs (13 tests)  [] Antibiotics & Antimycotics (14 tests)   [] Corticosteroids (15 tests)   [] Photopatch test (62 tests)   [] others: ...       [] Patient's own products: ...    DO NOT test if chemical or biological identity is unknown!     always ask from patient the product information and safety sheets (MSDS)       Order for PRICK TESTS    Reason for tests (suspected allergy): molds, dust mites?  Known previous allergies: none    Standardized prick panels  [x] Atopic panel (20 tests)  [] Pediatric Panel (12 tests)  [] Milk, Meat, Eggs, Grains (20 tests)   [] Dust, Epithelia, Feathers (10 tests)  [] Fish, Seafood, Shellfish (17 tests)  [] Nuts, Beans (14 tests)  [] Spice, Vegetable, Fruit (17 tests)  [] Others: ...      [] Patient's own products: ...    DO NOT test if chemical or biological identity is  unknown!     always ask from patient the product information and safety sheets (MSDS)     Standardized intradermal tests  [x] Penicillium notatum [x] Aspergillus fumigatus [x] House dust mites  [] Bee venom   [] Wasp venom !!Specific protocol with dilutions!!  [] Others: ...    ________________________________    RESULTS & EVALUATION of PATCH TESTS    Patch test readings after     [x] 2 days, [] 3 days [x] 4 days, [] 5 days,    July 5, 2021 application of patch tests with results:    STANDARD Series        # Substance 2 days 4 days remarks    1 Noe Mix [C] - -     2 Colophony - -     3  2-Mercaptobenzothiazole  - -      4 Methylisothiazolinone - -     5 Carba Mix - -     6 Thiuram Mix [A] - -     7 Bisphenol A Epoxy Resin - -     8 V-Ntgx-Pxbnrbgncff-Formaldehyde Resin - -     9 Mercapto Mix [A] - -     10 Black Rubber Mix- PPD [B] - -     11 Potassium Dichromate  -  -     12 Balsam of [place] (Myroxylon Pereirae Resin) - -     13 Nickel Sulphate Hexahydrate - -     14 Mixed Dialkyl Thiourea - -     15 Paraben Mix [B] - -     16 Methyldibromo Glutaronitrile - -     17 Fragrance Mix - -     18 2-Bromo-2-Nitropropane-1,3-Diol (Bronopol) CT NA NA     19 Lyral - -     20 Tixocortol-21- Pivalate CT - -     21 Diazolidiyl Urea (Germall II) - -     22 Methyl Methacrylate - -     23 Cobalt (II) Chloride Hexahydrate - -     24 Fragrance Mix II  - -     25 Compositae Mix - -     26 Benzoyl Peroxide - -     27 Bacitracin - -     28 Formaldehyde - -     29 Methylchloroisothiazolinone / Methylisothiazolinone - -     30 Corticosteroid Mix CT - -     31 Sodium Lauryl Sulfate - -     32 Lanolin Alcohol - -     33 Turpentine - -     34 Cetylstearylalcohol - -     35 Chlorhexidine Dicluconate - -     36 Budenoside - -     37 Imidazolidinyl Urea  - -     38 Ethyl-2 Cyanoacrylate - -     39 Quaternium 15 (Dowicil 200) - -     40 Decyl Glucoside - -    PRESERVATIVES & ANTIMICROBIALS        # Substance 2 days 4 days remarks   41 1   1,2-Benzisothiazoline-3-One, Sodium Salt - -     2  1,3,5-Diego (2-Hydroxyethyl) - Hexahydrotriazine (Grotan BK) - -     3 7-Jtjmjrrwhmffi-3-Nitro-1, 3-Propanediol Na NA     4  3, 4, 4' - Triclocarban - -    45 5 4 - Chloro - 3 - Cresol - -     6 4 - Chloro - 4 - Xylenol (PCMX) - -     7 7-Ethylbicyclooxazolidine (Bioban MP6609) - -     8 Benzalkonium Chloride CT NA NA     9 Benzyl Alcohol - -    50 10 Cetalkonium Chloride - -     11 Cetylpyrimidine Chloride  - -     12 Chloroacetamide - -     13 DMDM Hydantoin - -     14 Glutaraldehyde - -    55 15 Triclosan - -     16 Glyoxal Trimeric Dihydrate - -     17 Iodopropynyl Butylcarbamate - -     18 Octylisothiazoline - -     19 Bithionol CT - -    60 20 Bioban P 1487 (Nitrobutyl) Morpholine/(Ethylnitro-Trimethylene) Dimorpholine - -     21 Phenoxyethanol - -     22 Phenyl Salicylate - -     23 Povidone Iodine - -     24 Sodium Benzoate - -    65 25 Sodium Disulfite - -     26 Sorbic Acid - -     27 Thimerosal       28 Melamine Formaldehyde Resin       29 Ethylenediamine Dihydrochloride        Parabens      70 30 Butyl-P-Hydroxybenzoate - -     31 Ethyl-P-Hydroxybenzoate - -     32 Methyl-P-Hydroxybenzoate - -    73 33 Propyl-P-Hydroxybenzoate - -    EMULSIFIERS & ADDITIVES       # Substance 2 days 4 days remarks   74 1 Polyethylene Glycol-400 NA NA    75 2 Cocamidopropyl Betaine NA NA     3 Amerchol L101 - -     4 Propylene Glycol - -     5 Triethanolamine - -     6 Sorbitane Sesquiolate CT - -    80 7 Isopropylmyristate - -     8 Polysorbate 80 CT - -     9 Amidoamine   (Stearamidopropyl Dimethylamine) - -     10 Oleamidopropyl Dimethylamine - -     11 Lauryl Glucoside - -    85 12 Coconut Diethanolamide  - -     13 2-Hydroxy-4-Methoxy Benzophenone (Oxybenzone) - -     14 Benzophenone-4 (Sulisobenzon) - -     15 Propolis - -     16 Dexpanthenol - -    90 17 Carboxymethyl Cellulose Sodium - -     18 Abitol - -     19 Tert-Butylhydroquinone - -     20 Benzyl Salicylate -  -     21 Dimethylaminopropylamin (DMPA) CT? D053      95 22 Zinc Pyrithione (Zinc Omadine) CT? Z006       23 Diego(Hydroxymethyl) Nitromethane CT        Antioxidant - -     24 Dodecyl Gallate - -     25 Butylhydroxyanisole (BHA) - -     26 Butylhydroxytoluene (BHT) - -    100 27 Di-Alpha-Tocopherol (Vit E) - -    101 28 Propyl Gallate - -    PERFUMES, FLAVORS & PLANTS        # Substance 2 days 4 days remarks   102 1 Benzyl Cinnamate - -     2 Di-Limonene (Dipentene) - -     3 Cananga Odorata (Ylang Ylang) (I) - -    105 4 Lichen Acid Mix - -     5 Mentha Piperita Oil (Peppermint Oil) - -     6 Sesquiterpenelactone mix - -     7 Tea Tree Oil, Oxidized - -     8 Wood Tar Mix - -    110 9 Abietic Acid - -     10 Lavendula Angustifolia Oil (Lavender Oil) - -     11 Fragrance mix II CT * - -      Fragrance Mix I       12 [place] Absolute - -     13 Eugenol - -    115 14 Geraniol - -     15 Hydroxycitronellal - -     16 Isoeugenol - -     17 Cinnamic Aldehyde - -     18 Cinnamic Alcohol  - -      Fragrance mix II      120 19 Citronellol - -     20 Alpha-Hexylcinnamic Aldehyde    - -     21 Citral - -     22 Farnesol - -    124 23 Coumarin NA NA      Hexylcinnamic aldehyde, Coumarin, Farnesol, Hydroxyisohexy3-cyclohexene carboxaldehyde, citral, citrolellol  HAIRDRESSER        # Substance 2 days 4 days remarks   125 1 P-Phenylenediamin  -  -     2 P-Toluenediamine Sulphate  -  -     3 Ammonium Thioglycolate - -     4 Ammonium Persulfate - -     5 Resorcinol - -    130 6 3-Aminophenol - -     7 P-Aminophenol - -     8 Glyceryl Monothioglycolate - -     9 Pyrogallol - -    134 10 P-Aminodiphenylamine - -      Results of patch tests:                         Interpretation:  - Negative                    A    = Allergic      (+) Erythema    TI   = Toxic/irritant   + E + Infiltration    RaP = Relevance at Present     ++ E/I + Papulovesicle   Rpr  = Relevance Previously     +++ E/I/P + Blister     nR   = No Relevance    Atopy  Screen (Placed July 5, 2021 )    No Substance Readings (15 min) Evaluation   POS Histamine 1mg/ml +/++    NEG NaCl 0.9% -      No Substance Readings (15 min) Evaluation   1 Alternaria alternata (tenuis)  +/++    2 Cladosporium herbarum -    3 Aspergillus fumigatus -    4 Penicillium notatum -    5 Dermatophagoides pteronyssinus +/++    6 Dermatophagoides farinae -    7 Dog epithelium (canis spp) -    8 Cat hair (brian catus) +    9 Cockroach   (Blatella americana & germanica) +    10 Grass mix midwest   (Jolanta, Orchard, Redtop, Alejandro) -    11 [place] grass (sorghum halepense) -    12 Weed mix   (common Cocklebur, Lamb s quarters, rough redroot Pigweed, Dock/Sorrel) -    13 Mug wort (artemisia vulgare) -    14 Ragweed giant/short (ambrosia spp) -    15 White birch (Betula papyrifera) -    16 Tree mix 1 (Pecan, Maple BHR, Oak RVW, american [place], black [place]) -    17 Red cedar (juniperus virginia) ++    18 Tree mix 2   (white [place], river/red Birch, black [place], common [place], american Elm) ++    19 Box elder/Maple mix (acer spp) -    20 [place] shagbark (carya ovata) -           Conclusion      Intradermal Testing (Placed July 5, 2021 )    No Substance Conc.  Readings (15min) Evaluation   DF Standard Dust Mite - D. Farinae 1:10 neg 3mmP/E   A Aspergillus fumigatus  1:10 (+) 5mmP/E   P Penicillium notatum 1:10 neg neg   Conclusion: in IDT slight reation to Aspergillus, but no clear immediate reaction. Will see if any delayed reaction    [] No relevant allergic reaction observed    [] Allergic reaction diagnosed against following allergens:      Interpretation/ remarks:   See later    [] Patient information given   [] ACDS CAMP information's (# ....) to following compounds: .....   [] General information's to following compounds: ......      Assessment & Plan:    ==> Final Diagnosis:     # Recurrent dermatitis on extremities and scalp?  DDx: ACD vs atopic dermatitis  * chronic, active    # Suspicion for atopic  predisposition with    Perennial rhinoconjunctivitis, otitis and pharyngitis with sensitization to house dust mites    seasonal aggravation in spring with sensitization to tree pollens and fall sensitization to seasonal mold alternaria    Recurrent dermatitis  * chronic, active    These conclusions are made at the best of one's knowledge and belief based on the provided evidence such as patient's history and allergy test results and they can change over time or can be incomplete because of missing information's.    ==> Treatment Plan:  - Begin applying Protopic ointment to active plaques Monday through Friday and clobetasol ointment Saturday through Sunday  - Will perform prick testing to assess for atopy   - Will also perform intradermal testing with molds and dust mites  - Will perform patch testing to evaluate for allergic contact dermatitis   - Patient advised to avoid taking any antihistamines for at least 5 days prior to testing     Procedures Performed: Allergy tests, including prick, intradermal and patch tests    Staff: : provider    Follow-up in Derm-Allergy clinic for 1st readings of patch tests after 2 days (virtual) and 2nd readings and final conclusions after 4 days (in person)   ___________________________      I spent a total of 33 minutes with Rajni Lott during today s  visit. This time was spent discussing all the individual test results, correlating them to the clinical relevance, counseling the patient and/or coordinating care and performing allergy tests

## 2021-07-05 ENCOUNTER — OFFICE VISIT (OUTPATIENT)
Dept: ALLERGY | Facility: CLINIC | Age: 51
End: 2021-07-05
Payer: COMMERCIAL

## 2021-07-05 DIAGNOSIS — L23.89 ALLERGIC CONTACT DERMATITIS DUE TO OTHER AGENTS: Primary | ICD-10-CM

## 2021-07-05 DIAGNOSIS — J30.2 SEASONAL AND PERENNIAL ALLERGIC RHINOCONJUNCTIVITIS: ICD-10-CM

## 2021-07-05 DIAGNOSIS — J30.89 SEASONAL AND PERENNIAL ALLERGIC RHINOCONJUNCTIVITIS: ICD-10-CM

## 2021-07-05 DIAGNOSIS — L20.89 OTHER ATOPIC DERMATITIS: ICD-10-CM

## 2021-07-05 DIAGNOSIS — Z91.09 HOUSE DUST MITE ALLERGY: ICD-10-CM

## 2021-07-05 DIAGNOSIS — H10.10 SEASONAL AND PERENNIAL ALLERGIC RHINOCONJUNCTIVITIS: ICD-10-CM

## 2021-07-05 DIAGNOSIS — H65.413 CHRONIC ALLERGIC OTITIS MEDIA OF BOTH EARS: ICD-10-CM

## 2021-07-05 PROCEDURE — 95024 IQ TESTS W/ALLERGENIC XTRCS: CPT | Performed by: DERMATOLOGY

## 2021-07-05 PROCEDURE — 95044 PATCH/APPLICATION TESTS: CPT | Mod: 59 | Performed by: DERMATOLOGY

## 2021-07-05 PROCEDURE — 95004 PERQ TESTS W/ALRGNC XTRCS: CPT | Performed by: DERMATOLOGY

## 2021-07-05 NOTE — LETTER
7/5/2021         RE: Rajni Lott  [address] [address]        Dear Colleague,    Thank you for referring your patient, Rajni Lott, to the [facility] [place]. Please see a copy of my visit note below.    [facility] Dermato-allergology Note  Office visit  Encounter Date: Jul 5, 2021  ____________________________________________    CC: No chief complaint on file.      HPI:  Ms. Rajni Lott is a(n) 50 year old female who presents today as a return patient for allergy consultation  - Follow-up in Derm-Allergy clinic as prescribed in April 2021  - otherwise feeling well in usual state of health    Physical exam:  General: In no acute distress, well-developed, well-nourished  Eyes: no conjunctivitis  ENT: no signs of rhinitis   Pulmonary: no wheezing or coughing  Skin: Focused examination of the skin on test sites was performed = see test results below  No active eczematous skin lesions on tests sites, particularly back    Earlier History and Allergy exams:  - She describes a 1-2 year history of recurrent rashes on the elbows, knees arms and lateral breasts that are pruritic in nature but non tender and do not blister or ulcerate. She has clobetasol on hand for when this rash emerges but tries to use it very sparingly due to its high potency. She has no prior history of atopic dermatitis during childhood or asthma however over the past 5 years she has been experiencing more seasonal allergies. She describes itchy ears and throat and watery eyes randomly throughout the year, worse in the Spring and Fall. She takes Zyrtec and Pepcid to treat these allergies. She is also being treated for hair loss with Dr. Allison and denies any type of rash on the scalp similar to the body. She does dye her hair but denies any regular use of styling products. She uses a Zinc shampoo, a paraben and sulfate free conditioner. She also denies any personal or family  history of psoriasis. A biopsy of one of these skin plaques showed spongiotic dermatitis with differential including nummular or atopic dermatitis vs ACD.   Telemedicine photographs reviewed (Date of images: 3/1/20. Image quality and interpretability: acceptable. Location of teledermatologist: [facility] [place]. Start time: 7:25 AM. End time: 7:41 AM)  - Well demarcated pink eczematous plaques on the elbows and knees    Past Medical History:   Patient Active Problem List   Diagnosis     History of hysterectomy     History of deep vein thrombosis (DVT) of lower extremity     MTHFR gene mutation (H)     H/O laminectomy     Past Medical History:   Diagnosis Date     DVT (deep venous thrombosis) (H)      Low back pain      MTHFR mutation (H)      PONV (postoperative nausea and vomiting)        Allergies:  No Known Allergies    Medications:  Current Outpatient Medications   Medication     clindamycin (CLEOCIN T) 1 % external solution     clobetasol (TEMOVATE) 0.05 % external ointment     clobetasol (TEMOVATE) 0.05 % external ointment     clobetasol propionate (CLOBEX) 0.05 % external shampoo     gabapentin 6 % SOLN solution     ketoconazole (NIZORAL) 2 % external shampoo     tacrolimus (PROTOPIC) 0.1 % external ointment     valACYclovir (VALTREX) 1000 mg tablet     No current facility-administered medications for this visit.        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    Family History:  Family History   Problem Relation Age of Onset     Melanoma No family hx of      Skin Cancer No family hx of        Previous Labs, Allergy Tests, Dermatopathology, Imaging:  3/4/20 Skin, left elbow, punch:   - Subtle features of spongiotic dermatitis - (see comment)     COMMENT:   These changes suggest a partially treated eczematous dermatitis.  The   presence of occasional Langerhans' cell   aggregates suggests allergic  contact dermatitis, though the differential   diagnosis includes atopic, nummular   or other eczematous processes.  PAS stain is negative for fungal elements.     Referred By: Ami Allison MD  [address]  [place] [address]     Allergy Tests:  Past Allergy Test - NONE    Order for Future Allergy Testing:    [] Outpatient  [] Inpatient: [facility]..../ Bed ....       Skin Atopy (atopic dermatitis) [] Yes   [x] No  Contact allergies:   [] Yes   [x] No  Urticaria/Angioedema  [] Yes   [x] No  Rhinitis/Sinusitis:   [] Yes   [x] No   [] seasonal [x] perennial            Allergic Asthma:   [] Yes   [x] No  Pets :  [] Yes   [x] No  which?......  Food Allergy:   [] Yes   [x] No  Drug allergies:    [] Yes   [x] No  Leg ulcers:   [] Yes   [x] No  Hand eczema:   [] Yes   [x] No   Leading hand:   [] R   [] L       [] Ambidextrous                        Order for PATCH TESTS  Reason for tests (suspected allergy): fragrance, hair products?  Known previous allergies: none  Standardized panels  [x] Standard panel (40 tests)  [x] Preservatives & Antimicrobials (31 tests)  [x] Emulsifiers & Additives (25 tests)   [x] Perfumes/Flavours & Plants (25 tests)  [x] Hairdresser panel (12 tests)  [] Rubber Chemicals (22 tests)  [] Plastics (26 tests)  [] Colorants/Dyes/Food additives (20 tests)  [] Metals (implants/dental) (24 tests)  [] Local anaesthetics/NSAIDs (13 tests)  [] Antibiotics & Antimycotics (14 tests)   [] Corticosteroids (15 tests)   [] Photopatch test (62 tests)   [] others: ...       [] Patient's own products: ...    DO NOT test if chemical or biological identity is unknown!     always ask from patient the product information and safety sheets (MSDS)       Order for PRICK TESTS    Reason for tests (suspected allergy): molds, dust mites?  Known previous allergies: none    Standardized prick panels  [x] Atopic panel (20 tests)  [] Pediatric Panel (12 tests)  [] Milk, Meat, Eggs, Grains (20 tests)   []  Dust, Epithelia, Feathers (10 tests)  [] Fish, Seafood, Shellfish (17 tests)  [] Nuts, Beans (14 tests)  [] Spice, Vegetable, Fruit (17 tests)  [] Others: ...      [] Patient's own products: ...    DO NOT test if chemical or biological identity is unknown!     always ask from patient the product information and safety sheets (MSDS)     Standardized intradermal tests  [x] Penicillium notatum [x] Aspergillus fumigatus [x] House dust mites  [] Bee venom   [] Wasp venom !!Specific protocol with dilutions!!  [] Others: ...    ________________________________    RESULTS & EVALUATION of PATCH TESTS    Patch test readings after     [x] 2 days, [] 3 days [x] 4 days, [] 5 days,    July 5, 2021 application of patch tests with results:    STANDARD Series        # Substance 2 days 4 days remarks    1 Noe Mix [C] - -     2 Colophony - -     3  2-Mercaptobenzothiazole  - -      4 Methylisothiazolinone - -     5 Carba Mix - -     6 Thiuram Mix [A] - -     7 Bisphenol A Epoxy Resin - -     8 C-Bhvu-Ocjrvqvegwj-Formaldehyde Resin - -     9 Mercapto Mix [A] - -     10 Black Rubber Mix- PPD [B] - -     11 Potassium Dichromate  -  -     12 Balsam of [place] (Myroxylon Pereirae Resin) - -     13 Nickel Sulphate Hexahydrate - -     14 Mixed Dialkyl Thiourea - -     15 Paraben Mix [B] - -     16 Methyldibromo Glutaronitrile - -     17 Fragrance Mix - -     18 2-Bromo-2-Nitropropane-1,3-Diol (Bronopol) CT NA NA     19 Lyral - -     20 Tixocortol-21- Pivalate CT - -     21 Diazolidiyl Urea (Germall II) - -     22 Methyl Methacrylate - -     23 Cobalt (II) Chloride Hexahydrate - -     24 Fragrance Mix II  - -     25 Compositae Mix - -     26 Benzoyl Peroxide - -     27 Bacitracin - -     28 Formaldehyde - -     29 Methylchloroisothiazolinone / Methylisothiazolinone - -     30 Corticosteroid Mix CT - -     31 Sodium Lauryl Sulfate - -     32 Lanolin Alcohol - -     33 Turpentine - -     34 Cetylstearylalcohol - -     35 Chlorhexidine  Dicluconate - -     36 Budenoside - -     37 Imidazolidinyl Urea  - -     38 Ethyl-2 Cyanoacrylate - -     39 Quaternium 15 (Dowicil 200) - -     40 Decyl Glucoside - -    PRESERVATIVES & ANTIMICROBIALS        # Substance 2 days 4 days remarks   41 1  1,2-Benzisothiazoline-3-One, Sodium Salt - -     2  1,3,5-Diego (2-Hydroxyethyl) - Hexahydrotriazine (Grotan BK) - -     3 8-Wtfpbrvleclup-4-Nitro-1, 3-Propanediol Na NA     4  3, 4, 4' - Triclocarban - -    45 5 4 - Chloro - 3 - Cresol - -     6 4 - Chloro - 4 - Xylenol (PCMX) - -     7 7-Ethylbicyclooxazolidine (Bioban [id number]) - -     8 Benzalkonium Chloride CT NA NA     9 Benzyl Alcohol - -    50 10 Cetalkonium Chloride - -     11 Cetylpyrimidine Chloride  - -     12 Chloroacetamide - -     13 DMDM Hydantoin - -     14 Glutaraldehyde - -    55 15 Triclosan - -     16 Glyoxal Trimeric Dihydrate - -     17 Iodopropynyl Butylcarbamate - -     18 Octylisothiazoline - -     19 Bithionol CT - -    60 20 Bioban P 1487 (Nitrobutyl) Morpholine/(Ethylnitro-Trimethylene) Dimorpholine - -     21 Phenoxyethanol - -     22 Phenyl Salicylate - -     23 Povidone Iodine - -     24 Sodium Benzoate - -    65 25 Sodium Disulfite - -     26 Sorbic Acid - -     27 Thimerosal       28 Melamine Formaldehyde Resin       29 Ethylenediamine Dihydrochloride        Parabens      70 30 Butyl-P-Hydroxybenzoate - -     31 Ethyl-P-Hydroxybenzoate - -     32 Methyl-P-Hydroxybenzoate - -    73 33 Propyl-P-Hydroxybenzoate - -    EMULSIFIERS & ADDITIVES       # Substance 2 days 4 days remarks   74 1 Polyethylene Glycol-400 NA NA    75 2 Cocamidopropyl Betaine NA NA     3 Amerchol L101 - -     4 Propylene Glycol - -     5 Triethanolamine - -     6 Sorbitane Sesquiolate CT - -    80 7 Isopropylmyristate - -     8 Polysorbate 80 CT - -     9 Amidoamine   (Stearamidopropyl Dimethylamine) - -     10 Oleamidopropyl Dimethylamine - -     11 Lauryl Glucoside - -    85 12 Coconut Diethanolamide  - -     13  2-Hydroxy-4-Methoxy Benzophenone (Oxybenzone) - -     14 Benzophenone-4 (Sulisobenzon) - -     15 Propolis - -     16 Dexpanthenol - -    90 17 Carboxymethyl Cellulose Sodium - -     18 Abitol - -     19 Tert-Butylhydroquinone - -     20 Benzyl Salicylate - -     21 Dimethylaminopropylamin (DMPA) CT? D053      95 22 Zinc Pyrithione (Zinc Omadine) CT? Z006       23 Diego(Hydroxymethyl) Nitromethane CT        Antioxidant - -     24 Dodecyl Gallate - -     25 Butylhydroxyanisole (BHA) - -     26 Butylhydroxytoluene (BHT) - -    100 27 Di-Alpha-Tocopherol (Vit E) - -    101 28 Propyl Gallate - -    PERFUMES, FLAVORS & PLANTS        # Substance 2 days 4 days remarks   102 1 Benzyl Cinnamate - -     2 Di-Limonene (Dipentene) - -     3 Cananga Odorata (Maricel Connelly) (I) - -    105 4 Lichen Acid Mix - -     5 Mentha Piperita Oil (Peppermint Oil) - -     6 Sesquiterpenelactone mix - -     7 Tea Tree Oil, Oxidized - -     8 Wood Tar Mix - -    110 9 Abietic Acid - -     10 Lavendula Angustifolia Oil (Lavender Oil) - -     11 Fragrance mix II CT * - -      Fragrance Mix I       12 [place] Absolute - -     13 Eugenol - -    115 14 Geraniol - -     15 Hydroxycitronellal - -     16 Isoeugenol - -     17 Cinnamic Aldehyde - -     18 Cinnamic Alcohol  - -      Fragrance mix II      120 19 Citronellol - -     20 Alpha-Hexylcinnamic Aldehyde    - -     21 Citral - -     22 Farnesol - -    124 23 Coumarin NA NA      Hexylcinnamic aldehyde, Coumarin, Farnesol, Hydroxyisohexy3-cyclohexene carboxaldehyde, citral, citrolellol  HAIRDRESSER        # Substance 2 days 4 days remarks   125 1 P-Phenylenediamin  -  -     2 P-Toluenediamine Sulphate  -  -     3 Ammonium Thioglycolate - -     4 Ammonium Persulfate - -     5 Resorcinol - -    130 6 3-Aminophenol - -     7 P-Aminophenol - -     8 Glyceryl Monothioglycolate - -     9 Pyrogallol - -    134 10 P-Aminodiphenylamine - -      Results of patch tests:                          Interpretation:  - Negative                    A    = Allergic      (+) Erythema    TI   = Toxic/irritant   + E + Infiltration    RaP = Relevance at Present     ++ E/I + Papulovesicle   Rpr  = Relevance Previously     +++ E/I/P + Blister     nR   = No Relevance    Atopy Screen (Placed July 5, 2021 )    No Substance Readings (15 min) Evaluation   POS Histamine 1mg/ml +/++    NEG NaCl 0.9% -      No Substance Readings (15 min) Evaluation   1 Alternaria alternata (tenuis)  +/++    2 Cladosporium herbarum -    3 Aspergillus fumigatus -    4 Penicillium notatum -    5 Dermatophagoides pteronyssinus +/++    6 Dermatophagoides farinae -    7 Dog epithelium (canis spp) -    8 Cat hair (brian catus) +    9 Cockroach   (Blatella americana & germanica) +    10 Grass mix midwest   (Jolanta, Orchard, Redtop, Alejandro) -    11 Franklin grass (sorghum halepense) -    12 Weed mix   (common Cocklebur, Lamb s quarters, rough redroot Pigweed, Dock/Sorrel) -    13 Mug wort (artemisia vulgare) -    14 Ragweed giant/short (ambrosia spp) -    15 White birch (Betula papyrifera) -    16 Tree mix 1 (Pecan, Maple BHR, Oak RVW, american [place], black [place]) -    17 Red cedar (juniperus virginia) ++    18 Tree mix 2   (white [place], river/red Birch, black [place], common [place], american Elm) ++    19 Box elder/Maple mix (acer spp) -    20 Alcova shagbark (carya ovata) -           Conclusion      Intradermal Testing (Placed July 5, 2021 )    No Substance Conc.  Readings (15min) Evaluation   DF Standard Dust Mite - D. Farinae 1:10 neg 3mmP/E   A Aspergillus fumigatus  1:10 (+) 5mmP/E   P Penicillium notatum 1:10 neg neg   Conclusion: in IDT slight reation to Aspergillus, but no clear immediate reaction. Will see if any delayed reaction    [] No relevant allergic reaction observed    [] Allergic reaction diagnosed against following allergens:      Interpretation/ remarks:   See later    [] Patient information given   [] ACDS CAMP information's (#  ....) to following compounds: .....   [] General information's to following compounds: ......      Assessment & Plan:    ==> Final Diagnosis:     # Recurrent dermatitis on extremities and scalp?  DDx: ACD vs atopic dermatitis  * chronic, active    # Suspicion for atopic predisposition with    Perennial rhinoconjunctivitis, otitis and pharyngitis with sensitization to house dust mites    seasonal aggravation in spring with sensitization to tree pollens and fall sensitization to seasonal mold alternaria    Recurrent dermatitis  * chronic, active    These conclusions are made at the best of one's knowledge and belief based on the provided evidence such as patient's history and allergy test results and they can change over time or can be incomplete because of missing information's.    ==> Treatment Plan:  - Begin applying Protopic ointment to active plaques Monday through Friday and clobetasol ointment Saturday through Sunday  - Will perform prick testing to assess for atopy   - Will also perform intradermal testing with molds and dust mites  - Will perform patch testing to evaluate for allergic contact dermatitis   - Patient advised to avoid taking any antihistamines for at least 5 days prior to testing     Procedures Performed: Allergy tests, including prick, intradermal and patch tests    Staff: : provider    Follow-up in Derm-Allergy clinic for 1st readings of patch tests after 2 days (virtual) and 2nd readings and final conclusions after 4 days (in person)   ___________________________      I spent a total of 33 minutes with Rajni Lott during today s  visit. This time was spent discussing all the individual test results, correlating them to the clinical relevance, counseling the patient and/or coordinating care and performing allergy tests           Again, thank you for allowing me to participate in the care of your patient.        Sincerely,        Trace Marcos MD

## 2021-07-07 ENCOUNTER — VIRTUAL VISIT (OUTPATIENT)
Dept: ALLERGY | Facility: CLINIC | Age: 51
End: 2021-07-07
Payer: COMMERCIAL

## 2021-07-07 DIAGNOSIS — H65.413 CHRONIC ALLERGIC OTITIS MEDIA OF BOTH EARS: ICD-10-CM

## 2021-07-07 DIAGNOSIS — L23.89 ALLERGIC CONTACT DERMATITIS DUE TO OTHER AGENTS: Primary | ICD-10-CM

## 2021-07-07 DIAGNOSIS — L20.89 OTHER ATOPIC DERMATITIS: ICD-10-CM

## 2021-07-07 DIAGNOSIS — Z91.09 HOUSE DUST MITE ALLERGY: ICD-10-CM

## 2021-07-07 PROCEDURE — 99207 PR NO CHARGE LOS: CPT | Mod: 95 | Performed by: DERMATOLOGY

## 2021-07-07 ASSESSMENT — PAIN SCALES - GENERAL: PAINLEVEL: NO PAIN (0)

## 2021-07-07 NOTE — LETTER
7/7/2021         RE: Rajni Lott  [address] [facility] [address] [facility] [address] [facility] [address]        Dear Colleague,    Thank you for referring your patient, Rajni Lott, to the [facility] [place]. Please see a copy of my visit note below.    [facility] Dermato-allergology Note  Office visit  Encounter Date: Jul 7, 2021  ____________________________________________    CC: No chief complaint on file.      HPI:  Ms. Rajni Lott is a(n) 50 year old female who presents today as a return patient for allergy consultation  - Follow-up in Derm-Allergy clinic for 1st readings of patch tests after 2 days (virtual)   - otherwise feeling well in usual state of health    Physical exam:  General: In no acute distress, well-developed, well-nourished  Eyes: no conjunctivitis  ENT: no signs of rhinitis   Pulmonary: no wheezing or coughing  Skin:Focused examination of the skin on test sites was performed = see test results below    Earlier History and Allergy exams:    - She describes a 1-2 year history of recurrent rashes on the elbows, knees arms and lateral breasts that are pruritic in nature but non tender and do not blister or ulcerate. She has clobetasol on hand for when this rash emerges but tries to use it very sparingly due to its high potency. She has no prior history of atopic dermatitis during childhood or asthma however over the past 5 years she has been experiencing more seasonal allergies. She describes itchy ears and throat and watery eyes randomly throughout the year, worse in the Spring and Fall. She takes Zyrtec and Pepcid to treat these allergies. She is also being treated for hair loss with Dr. Allison and denies any type of rash on the scalp similar to the body. She does dye her hair but denies any regular use of styling products. She uses a Zinc shampoo, a paraben and sulfate free conditioner. She also denies any personal or family history of psoriasis. A biopsy of one of  these skin plaques showed spongiotic dermatitis with differential including nummular or atopic dermatitis vs ACD.   Telemedicine photographs reviewed (Date of images: 3/1/20. Image quality and interpretability: acceptable. Location of teledermatologist: [facility] [place]. Start time: 7:25 AM. End time: 7:41 AM)  - Well demarcated pink eczematous plaques on the elbows and knees    Past Medical History:   Patient Active Problem List   Diagnosis     History of hysterectomy     History of deep vein thrombosis (DVT) of lower extremity     MTHFR gene mutation (H)     H/O laminectomy     Past Medical History:   Diagnosis Date     DVT (deep venous thrombosis) (H)      Low back pain      MTHFR mutation (H)      PONV (postoperative nausea and vomiting)        Allergies:  No Known Allergies    Medications:  Current Outpatient Medications   Medication     clindamycin (CLEOCIN T) 1 % external solution     clobetasol (TEMOVATE) 0.05 % external ointment     clobetasol (TEMOVATE) 0.05 % external ointment     clobetasol propionate (CLOBEX) 0.05 % external shampoo     gabapentin 6 % SOLN solution     ketoconazole (NIZORAL) 2 % external shampoo     tacrolimus (PROTOPIC) 0.1 % external ointment     valACYclovir (VALTREX) 1000 mg tablet     No current facility-administered medications for this visit.        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    Family History:  Family History   Problem Relation Age of Onset     Melanoma No family hx of      Skin Cancer No family hx of        Previous Labs, Allergy Tests, Dermatopathology, Imaging:  3/4/20 Skin, left elbow, punch:   - Subtle features of spongiotic dermatitis - (see comment)     COMMENT:   These changes suggest a partially treated eczematous dermatitis.  The   presence of occasional Langerhans' cell   aggregates suggests allergic contact dermatitis, though the  differential   diagnosis includes atopic, nummular   or other eczematous processes.  PAS stain is negative for fungal elements.     Referred By: Ami Allison MD  [address] [facility] [address]  [place] [address]     Allergy Tests:  Past Allergy Test - NONE    Order for Future Allergy Testing:    [] Outpatient  [] Inpatient: [facility]..../ Bed ....       Skin Atopy (atopic dermatitis) [] Yes   [x] No  Contact allergies:   [] Yes   [x] No  Urticaria/Angioedema  [] Yes   [x] No  Rhinitis/Sinusitis:   [] Yes   [x] No   [] seasonal [x] perennial            Allergic Asthma:   [] Yes   [x] No  Pets :  [] Yes   [x] No  which?......  Food Allergy:   [] Yes   [x] No  Drug allergies:    [] Yes   [x] No  Leg ulcers:   [] Yes   [x] No  Hand eczema:   [] Yes   [x] No   Leading hand:   [] R   [] L       [] Ambidextrous                        Order for PATCH TESTS  Reason for tests (suspected allergy): fragrance, hair products?  Known previous allergies: none  Standardized panels  [x] Standard panel (40 tests)  [x] Preservatives & Antimicrobials (31 tests)  [x] Emulsifiers & Additives (25 tests)   [x] Perfumes/Flavours & Plants (25 tests)  [x] Hairdresser panel (12 tests)  [] Rubber Chemicals (22 tests)  [] Plastics (26 tests)  [] Colorants/Dyes/Food additives (20 tests)  [] Metals (implants/dental) (24 tests)  [] Local anaesthetics/NSAIDs (13 tests)  [] Antibiotics & Antimycotics (14 tests)   [] Corticosteroids (15 tests)   [] Photopatch test (62 tests)   [] others: ...       [] Patient's own products: ...    DO NOT test if chemical or biological identity is unknown!     always ask from patient the product information and safety sheets (MSDS)       Order for PRICK TESTS    Reason for tests (suspected allergy): molds, dust mites?  Known previous allergies: none    Standardized prick panels  [x] Atopic panel (20 tests)  [] Pediatric Panel (12 tests)  [] Milk, Meat, Eggs, Grains (20 tests)   [] Dust, Epithelia, Feathers (10  tests)  [] Fish, Seafood, Shellfish (17 tests)  [] Nuts, Beans (14 tests)  [] Spice, Vegetable, Fruit (17 tests)  [] Others: ...      [] Patient's own products: ...    DO NOT test if chemical or biological identity is unknown!     always ask from patient the product information and safety sheets (MSDS)     Standardized intradermal tests  [x] Penicillium notatum [x] Aspergillus fumigatus [x] House dust mites  [] Bee venom   [] Wasp venom !!Specific protocol with dilutions!!  [] Others: ...    ________________________________    RESULTS & EVALUATION of PATCH TESTS    Patch test readings after     [x] 2 days, [] 3 days [x] 4 days, [] 5 days,    July 5, 2021 application of patch tests with results:    STANDARD Series        # Substance 2 days 4 days remarks    1 Noe Mix [C] - -     2 Colophony - -     3  2-Mercaptobenzothiazole  - -      4 Methylisothiazolinone - -     5 Carba Mix - -     6 Thiuram Mix [A] - -     7 Bisphenol A Epoxy Resin - -     8 D-Wuqy-Svnkdrjgeml-Formaldehyde Resin - -     9 Mercapto Mix [A] - -     10 Black Rubber Mix- PPD [B] - -     11 Potassium Dichromate  -  -     12 Balsam of [place] (Myroxylon Pereirae Resin) - -     13 Nickel Sulphate Hexahydrate - -     14 Mixed Dialkyl Thiourea - -     15 Paraben Mix [B] - -     16 Methyldibromo Glutaronitrile - -     17 Fragrance Mix - -     18 2-Bromo-2-Nitropropane-1,3-Diol (Bronopol) CT NA NA     19 Lyral - -     20 Tixocortol-21- Pivalate CT - -     21 Diazolidiyl Urea (Germall II) - -     22 Methyl Methacrylate - -     23 Cobalt (II) Chloride Hexahydrate - -     24 Fragrance Mix II  - -     25 Compositae Mix - -     26 Benzoyl Peroxide - -     27 Bacitracin - -     28 Formaldehyde - -     29 Methylchloroisothiazolinone / Methylisothiazolinone - -     30 Corticosteroid Mix CT - -     31 Sodium Lauryl Sulfate - -     32 Lanolin Alcohol - -     33 Turpentine - -     34 Cetylstearylalcohol - -     35 Chlorhexidine Dicluconate - -     36 Budenoside - -      37 Imidazolidinyl Urea  - -     38 Ethyl-2 Cyanoacrylate - -     39 Quaternium 15 (Dowicil 200) - -     40 Decyl Glucoside - -    PRESERVATIVES & ANTIMICROBIALS        # Substance 2 days 4 days remarks   41 1  1,2-Benzisothiazoline-3-One, Sodium Salt - -     2  1,3,5-Diego (2-Hydroxyethyl) - Hexahydrotriazine (Grotan BK) - -     3 2-Qenbybvdrmzme-4-Nitro-1, 3-Propanediol Na NA     4  3, 4, 4' - Triclocarban - -    45 5 4 - Chloro - 3 - Cresol - -     6 4 - Chloro - 4 - Xylenol (PCMX) - -     7 7-Ethylbicyclooxazolidine (Bioban QD9008) - -     8 Benzalkonium Chloride CT NA NA     9 Benzyl Alcohol - -    50 10 Cetalkonium Chloride - -     11 Cetylpyrimidine Chloride  - -     12 Chloroacetamide - -     13 DMDM Hydantoin - -     14 Glutaraldehyde - -    55 15 Triclosan - -     16 Glyoxal Trimeric Dihydrate - -     17 Iodopropynyl Butylcarbamate - -     18 Octylisothiazoline - -     19 Bithionol CT - -    60 20 Bioban P 1487 (Nitrobutyl) Morpholine/(Ethylnitro-Trimethylene) Dimorpholine - -     21 Phenoxyethanol - -     22 Phenyl Salicylate - -     23 Povidone Iodine - -     24 Sodium Benzoate - -    65 25 Sodium Disulfite - -     26 Sorbic Acid - -     27 Thimerosal       28 Melamine Formaldehyde Resin       29 Ethylenediamine Dihydrochloride        Parabens      70 30 Butyl-P-Hydroxybenzoate - -     31 Ethyl-P-Hydroxybenzoate - -     32 Methyl-P-Hydroxybenzoate - -    73 33 Propyl-P-Hydroxybenzoate - -    EMULSIFIERS & ADDITIVES       # Substance 2 days 4 days remarks   74 1 Polyethylene Glycol-400 NA NA    75 2 Cocamidopropyl Betaine NA NA     3 Amerchol L101 - -     4 Propylene Glycol - -     5 Triethanolamine - -     6 Sorbitane Sesquiolate CT - -    80 7 Isopropylmyristate - -     8 Polysorbate 80 CT - -     9 Amidoamine   (Stearamidopropyl Dimethylamine) - -     10 Oleamidopropyl Dimethylamine - -     11 Lauryl Glucoside - -    85 12 Coconut Diethanolamide  - -     13 2-Hydroxy-4-Methoxy Benzophenone  (Oxybenzone) - -     14 Benzophenone-4 (Sulisobenzon) - -     15 Propolis - -     16 Dexpanthenol - -    90 17 Carboxymethyl Cellulose Sodium - -     18 Abitol - -     19 Tert-Butylhydroquinone - -     20 Benzyl Salicylate - -     21 Dimethylaminopropylamin (DMPA) CT? D053      95 22 Zinc Pyrithione (Zinc Omadine) CT? Z006       23 Diego(Hydroxymethyl) Nitromethane CT        Antioxidant - -     24 Dodecyl Gallate - -     25 Butylhydroxyanisole (BHA) - -     26 Butylhydroxytoluene (BHT) - -    100 27 Di-Alpha-Tocopherol (Vit E) - -    101 28 Propyl Gallate - -    PERFUMES, FLAVORS & PLANTS        # Substance 2 days 4 days remarks   102 1 Benzyl Cinnamate - -     2 Di-Limonene (Dipentene) - -     3 Cananga Odorata (Maricel Connelly) (I) - -    105 4 Lichen Acid Mix - -     5 Mentha Piperita Oil (Peppermint Oil) - -     6 Sesquiterpenelactone mix - -     7 Tea Tree Oil, Oxidized - -     8 Wood Tar Mix - -    110 9 Abietic Acid - -     10 Lavendula Angustifolia Oil (Lavender Oil) - -     11 Fragrance mix II CT * - -      Fragrance Mix I       12 [place] Absolute - -     13 Eugenol - -    115 14 Geraniol - -     15 Hydroxycitronellal - -     16 Isoeugenol - -     17 Cinnamic Aldehyde - -     18 Cinnamic Alcohol  - -      Fragrance mix II      120 19 Citronellol - -     20 Alpha-Hexylcinnamic Aldehyde    - -     21 Citral - -     22 Farnesol - -    124 23 Coumarin NA NA      Hexylcinnamic aldehyde, Coumarin, Farnesol, Hydroxyisohexy3-cyclohexene carboxaldehyde, citral, citrolellol  HAIRDRESSER        # Substance 2 days 4 days remarks   125 1 P-Phenylenediamin  -  -     2 P-Toluenediamine Sulphate  -  -     3 Ammonium Thioglycolate - -     4 Ammonium Persulfate - -     5 Resorcinol - -    130 6 3-Aminophenol - -     7 P-Aminophenol - -     8 Glyceryl Monothioglycolate - -     9 Pyrogallol - -    134 10 P-Aminodiphenylamine - -      Results of patch tests:                         Interpretation:  - Negative                    A     = Allergic      (+) Erythema    TI   = Toxic/irritant   + E + Infiltration    RaP = Relevance at Present     ++ E/I + Papulovesicle   Rpr  = Relevance Previously     +++ E/I/P + Blister     nR   = No Relevance    Atopy Screen (Placed July 5, 2021 )    No Substance Readings (15 min) Evaluation   POS Histamine 1mg/ml +/++    NEG NaCl 0.9% -      No Substance Readings (15 min) Evaluation   1 Alternaria alternata (tenuis)  +/++    2 Cladosporium herbarum -    3 Aspergillus fumigatus -    4 Penicillium notatum -    5 Dermatophagoides pteronyssinus +/++    6 Dermatophagoides farinae -    7 Dog epithelium (canis spp) -    8 Cat hair (brian catus) +    9 Cockroach   (Blatella americana & germanica) +    10 Grass mix midwest   (Jolanta, Orchard, Redtop, Alejandro) -    11 Franklin grass (sorghum halepense) -    12 Weed mix   (common Cocklebur, Lamb s quarters, rough redroot Pigweed, Dock/Sorrel) -    13 Mug wort (artemisia vulgare) -    14 Ragweed giant/short (ambrosia spp) -    15 White birch (Betula papyrifera) -    16 Tree mix 1 (Pecan, Maple BHR, Oak RVW, american [place], black [place]) -    17 Red cedar (juniperus virginia) ++    18 Tree mix 2   (white [place], river/red Birch, black [place], common [place], american Elm) ++    19 Box elder/Maple mix (acer spp) -    20 Acadia shagbark (carya ovata) -           Conclusion      Intradermal Testing (Placed July 5, 2021 )    No Substance Conc.  Readings (15min) Evaluation   DF Standard Dust Mite - D. Farinae 1:10 neg 3mmP/E   A Aspergillus fumigatus  1:10 (+) 5mmP/E   P Penicillium notatum 1:10 neg neg   Conclusion: in IDT slight reation to Aspergillus, but no clear immediate reaction. After 2 days no delayed reaction    [x] No relevant allergic reaction observed    [] Allergic reaction diagnosed against following allergens:      Interpretation/ remarks:   See later    [] Patient information given   [] ACDS CAMP information's (# ....) to following compounds: .....   [] General  information's to following compounds: ......      Assessment & Plan:    ==> Final Diagnosis:     # Recurrent dermatitis on extremities and scalp?  DDx: ACD vs atopic dermatitis  * chronic, active    # Suspicion for atopic predisposition with    Perennial rhinoconjunctivitis, otitis and pharyngitis with sensitization to house dust mites    seasonal aggravation in spring with sensitization to tree pollens and fall sensitization to seasonal mold alternaria    Recurrent dermatitis  * chronic, active    These conclusions are made at the best of one's knowledge and belief based on the provided evidence such as patient's history and allergy test results and they can change over time or can be incomplete because of missing information's.    ==> Treatment Plan:  - contiue applying Protopic ointment to active plaques Monday through Friday and clobetasol ointment Saturday through Sunday     Staff: : provider    Follow-up in Derm-Allergy clinic for 2nd readings and final conclusions after 4 days (in person)     ___________________________    Start time: 12:38 am  End time: 1253 am    I spent a total of 15 minutes with Rajni Lott during today s  visit. This time was spent discussing all the individual test results, correlating them to the clinical relevance, counseling the patient and/or coordinating care and performing allergy tests or procedures.           Again, thank you for allowing me to participate in the care of your patient.        Sincerely,        Trace Marcos MD

## 2021-07-07 NOTE — NURSING NOTE
Dermatology Rooming Note    Rajni Lott's goals for this visit include:   Chief Complaint   Patient presents with     RECHECK     Day 3 of patch testing      Arthur Quinonez CMA

## 2021-07-07 NOTE — PROGRESS NOTES
[facility] Dermato-allergology Note  Office visit  Encounter Date: Jul 7, 2021  ____________________________________________    CC: No chief complaint on file.      HPI:  Ms. Rajni Lott is a(n) 50 year old female who presents today as a return patient for allergy consultation  - Follow-up in Derm-Allergy clinic for 1st readings of patch tests after 2 days (virtual)   - otherwise feeling well in usual state of health    Physical exam:  General: In no acute distress, well-developed, well-nourished  Eyes: no conjunctivitis  ENT: no signs of rhinitis   Pulmonary: no wheezing or coughing  Skin:Focused examination of the skin on test sites was performed = see test results below    Earlier History and Allergy exams:    - She describes a 1-2 year history of recurrent rashes on the elbows, knees arms and lateral breasts that are pruritic in nature but non tender and do not blister or ulcerate. She has clobetasol on hand for when this rash emerges but tries to use it very sparingly due to its high potency. She has no prior history of atopic dermatitis during childhood or asthma however over the past 5 years she has been experiencing more seasonal allergies. She describes itchy ears and throat and watery eyes randomly throughout the year, worse in the Spring and Fall. She takes Zyrtec and Pepcid to treat these allergies. She is also being treated for hair loss with Dr. Allison and denies any type of rash on the scalp similar to the body. She does dye her hair but denies any regular use of styling products. She uses a Zinc shampoo, a paraben and sulfate free conditioner. She also denies any personal or family history of psoriasis. A biopsy of one of these skin plaques showed spongiotic dermatitis with differential including nummular or atopic dermatitis vs ACD.   Telemedicine photographs reviewed (Date of images: 3/1/20. Image quality and interpretability: acceptable. Location of teledermatologist: [facility]  [facility] [place]. Start time: 7:25 AM. End time: 7:41 AM)  - Well demarcated pink eczematous plaques on the elbows and knees    Past Medical History:   Patient Active Problem List   Diagnosis     History of hysterectomy     History of deep vein thrombosis (DVT) of lower extremity     MTHFR gene mutation (H)     H/O laminectomy     Past Medical History:   Diagnosis Date     DVT (deep venous thrombosis) (H)      Low back pain      MTHFR mutation (H)      PONV (postoperative nausea and vomiting)        Allergies:  No Known Allergies    Medications:  Current Outpatient Medications   Medication     clindamycin (CLEOCIN T) 1 % external solution     clobetasol (TEMOVATE) 0.05 % external ointment     clobetasol (TEMOVATE) 0.05 % external ointment     clobetasol propionate (CLOBEX) 0.05 % external shampoo     gabapentin 6 % SOLN solution     ketoconazole (NIZORAL) 2 % external shampoo     tacrolimus (PROTOPIC) 0.1 % external ointment     valACYclovir (VALTREX) 1000 mg tablet     No current facility-administered medications for this visit.        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    Family History:  Family History   Problem Relation Age of Onset     Melanoma No family hx of      Skin Cancer No family hx of        Previous Labs, Allergy Tests, Dermatopathology, Imaging:  3/4/20 Skin, left elbow, punch:   - Subtle features of spongiotic dermatitis - (see comment)     COMMENT:   These changes suggest a partially treated eczematous dermatitis.  The   presence of occasional Langerhans' cell   aggregates suggests allergic contact dermatitis, though the differential   diagnosis includes atopic, nummular   or other eczematous processes.  PAS stain is negative for fungal elements.     Referred By: Ami Allison MD  [address] [facility] [address]  [place] [address]     Allergy Tests:  Past Allergy Test -  NONE    Order for Future Allergy Testing:    [] Outpatient  [] Inpatient: [facility]..../ Bed ....       Skin Atopy (atopic dermatitis) [] Yes   [x] No  Contact allergies:   [] Yes   [x] No  Urticaria/Angioedema  [] Yes   [x] No  Rhinitis/Sinusitis:   [] Yes   [x] No   [] seasonal [x] perennial            Allergic Asthma:   [] Yes   [x] No  Pets :  [] Yes   [x] No  which?......  Food Allergy:   [] Yes   [x] No  Drug allergies:    [] Yes   [x] No  Leg ulcers:   [] Yes   [x] No  Hand eczema:   [] Yes   [x] No   Leading hand:   [] R   [] L       [] Ambidextrous                        Order for PATCH TESTS  Reason for tests (suspected allergy): fragrance, hair products?  Known previous allergies: none  Standardized panels  [x] Standard panel (40 tests)  [x] Preservatives & Antimicrobials (31 tests)  [x] Emulsifiers & Additives (25 tests)   [x] Perfumes/Flavours & Plants (25 tests)  [x] Hairdresser panel (12 tests)  [] Rubber Chemicals (22 tests)  [] Plastics (26 tests)  [] Colorants/Dyes/Food additives (20 tests)  [] Metals (implants/dental) (24 tests)  [] Local anaesthetics/NSAIDs (13 tests)  [] Antibiotics & Antimycotics (14 tests)   [] Corticosteroids (15 tests)   [] Photopatch test (62 tests)   [] others: ...       [] Patient's own products: ...    DO NOT test if chemical or biological identity is unknown!     always ask from patient the product information and safety sheets (MSDS)       Order for PRICK TESTS    Reason for tests (suspected allergy): molds, dust mites?  Known previous allergies: none    Standardized prick panels  [x] Atopic panel (20 tests)  [] Pediatric Panel (12 tests)  [] Milk, Meat, Eggs, Grains (20 tests)   [] Dust, Epithelia, Feathers (10 tests)  [] Fish, Seafood, Shellfish (17 tests)  [] Nuts, Beans (14 tests)  [] Spice, Vegetable, Fruit (17 tests)  [] Others: ...      [] Patient's own products: ...    DO NOT test if chemical or biological identity is unknown!     always ask from patient the product  information and safety sheets (MSDS)     Standardized intradermal tests  [x] Penicillium notatum [x] Aspergillus fumigatus [x] House dust mites  [] Bee venom   [] Wasp venom !!Specific protocol with dilutions!!  [] Others: ...    ________________________________    RESULTS & EVALUATION of PATCH TESTS    Patch test readings after     [x] 2 days, [] 3 days [x] 4 days, [] 5 days,    July 5, 2021 application of patch tests with results:    STANDARD Series        # Substance 2 days 4 days remarks    1 Noe Mix [C] - -     2 Colophony - -     3  2-Mercaptobenzothiazole  - -      4 Methylisothiazolinone - -     5 Carba Mix - -     6 Thiuram Mix [A] - -     7 Bisphenol A Epoxy Resin - -     8 M-Wpyp-Agmrgbresmd-Formaldehyde Resin - -     9 Mercapto Mix [A] - -     10 Black Rubber Mix- PPD [B] - -     11 Potassium Dichromate  -  -     12 Balsam of [place] (Myroxylon Pereirae Resin) - -     13 Nickel Sulphate Hexahydrate - -     14 Mixed Dialkyl Thiourea - -     15 Paraben Mix [B] - -     16 Methyldibromo Glutaronitrile - -     17 Fragrance Mix - -     18 2-Bromo-2-Nitropropane-1,3-Diol (Bronopol) CT NA NA     19 Lyral - -     20 Tixocortol-21- Pivalate CT - -     21 Diazolidiyl Urea (Germall II) - -     22 Methyl Methacrylate - -     23 Cobalt (II) Chloride Hexahydrate - -     24 Fragrance Mix II  - -     25 Compositae Mix - -     26 Benzoyl Peroxide - -     27 Bacitracin - -     28 Formaldehyde - -     29 Methylchloroisothiazolinone / Methylisothiazolinone - -     30 Corticosteroid Mix CT - -     31 Sodium Lauryl Sulfate - -     32 Lanolin Alcohol - -     33 Turpentine - -     34 Cetylstearylalcohol - -     35 Chlorhexidine Dicluconate - -     36 Budenoside - -     37 Imidazolidinyl Urea  - -     38 Ethyl-2 Cyanoacrylate - -     39 Quaternium 15 (Dowicil 200) - -     40 Decyl Glucoside - -    PRESERVATIVES & ANTIMICROBIALS        # Substance 2 days 4 days remarks   41 1  1,2-Benzisothiazoline-3-One, Sodium Salt - -     2   1,3,5-Diego (2-Hydroxyethyl) - Hexahydrotriazine (Grotan BK) - -     3 3-Oyruwekhodvwl-4-Nitro-1, 3-Propanediol Na NA     4  3, 4, 4' - Triclocarban - -    45 5 4 - Chloro - 3 - Cresol - -     6 4 - Chloro - 4 - Xylenol (PCMX) - -     7 7-Ethylbicyclooxazolidine (Bioban YT6789) - -     8 Benzalkonium Chloride CT NA NA     9 Benzyl Alcohol - -    50 10 Cetalkonium Chloride - -     11 Cetylpyrimidine Chloride  - -     12 Chloroacetamide - -     13 DMDM Hydantoin - -     14 Glutaraldehyde - -    55 15 Triclosan - -     16 Glyoxal Trimeric Dihydrate - -     17 Iodopropynyl Butylcarbamate - -     18 Octylisothiazoline - -     19 Bithionol CT - -    60 20 Bioban P 1487 (Nitrobutyl) Morpholine/(Ethylnitro-Trimethylene) Dimorpholine - -     21 Phenoxyethanol - -     22 Phenyl Salicylate - -     23 Povidone Iodine - -     24 Sodium Benzoate - -    65 25 Sodium Disulfite - -     26 Sorbic Acid - -     27 Thimerosal       28 Melamine Formaldehyde Resin       29 Ethylenediamine Dihydrochloride        Parabens      70 30 Butyl-P-Hydroxybenzoate - -     31 Ethyl-P-Hydroxybenzoate - -     32 Methyl-P-Hydroxybenzoate - -    73 33 Propyl-P-Hydroxybenzoate - -    EMULSIFIERS & ADDITIVES       # Substance 2 days 4 days remarks   74 1 Polyethylene Glycol-400 NA NA    75 2 Cocamidopropyl Betaine NA NA     3 Amerchol L101 - -     4 Propylene Glycol - -     5 Triethanolamine - -     6 Sorbitane Sesquiolate CT - -    80 7 Isopropylmyristate - -     8 Polysorbate 80 CT - -     9 Amidoamine   (Stearamidopropyl Dimethylamine) - -     10 Oleamidopropyl Dimethylamine - -     11 Lauryl Glucoside - -    85 12 Coconut Diethanolamide  - -     13 2-Hydroxy-4-Methoxy Benzophenone (Oxybenzone) - -     14 Benzophenone-4 (Sulisobenzon) - -     15 Propolis - -     16 Dexpanthenol - -    90 17 Carboxymethyl Cellulose Sodium - -     18 Abitol - -     19 Tert-Butylhydroquinone - -     20 Benzyl Salicylate - -     21 Dimethylaminopropylamin (DMPA) CT? D053       95 22 Zinc Pyrithione (Zinc Omadine) CT? Z006       23 Diego(Hydroxymethyl) Nitromethane CT        Antioxidant - -     24 Dodecyl Gallate - -     25 Butylhydroxyanisole (BHA) - -     26 Butylhydroxytoluene (BHT) - -    100 27 Di-Alpha-Tocopherol (Vit E) - -    101 28 Propyl Gallate - -    PERFUMES, FLAVORS & PLANTS        # Substance 2 days 4 days remarks   102 1 Benzyl Cinnamate - -     2 Di-Limonene (Dipentene) - -     3 Cananga Odorata (Ylang Ylang) (I) - -    105 4 Lichen Acid Mix - -     5 Mentha Piperita Oil (Peppermint Oil) - -     6 Sesquiterpenelactone mix - -     7 Tea Tree Oil, Oxidized - -     8 Wood Tar Mix - -    110 9 Abietic Acid - -     10 Lavendula Angustifolia Oil (Lavender Oil) - -     11 Fragrance mix II CT * - -      Fragrance Mix I       12 Oakmoss Absolute - -     13 Eugenol - -    115 14 Geraniol - -     15 Hydroxycitronellal - -     16 Isoeugenol - -     17 Cinnamic Aldehyde - -     18 Cinnamic Alcohol  - -      Fragrance mix II      120 19 Citronellol - -     20 Alpha-Hexylcinnamic Aldehyde    - -     21 Citral - -     22 Farnesol - -    124 23 Coumarin NA NA      Hexylcinnamic aldehyde, Coumarin, Farnesol, Hydroxyisohexy3-cyclohexene carboxaldehyde, citral, citrolellol  HAIRDRESSER        # Substance 2 days 4 days remarks   125 1 P-Phenylenediamin  -  -     2 P-Toluenediamine Sulphate  -  -     3 Ammonium Thioglycolate - -     4 Ammonium Persulfate - -     5 Resorcinol - -    130 6 3-Aminophenol - -     7 P-Aminophenol - -     8 Glyceryl Monothioglycolate - -     9 Pyrogallol - -    134 10 P-Aminodiphenylamine - -      Results of patch tests:                         Interpretation:  - Negative                    A    = Allergic      (+) Erythema    TI   = Toxic/irritant   + E + Infiltration    RaP = Relevance at Present     ++ E/I + Papulovesicle   Rpr  = Relevance Previously     +++ E/I/P + Blister     nR   = No Relevance    Atopy Screen (Placed July 5, 2021 )    No Substance Readings  (15 min) Evaluation   POS Histamine 1mg/ml +/++    NEG NaCl 0.9% -      No Substance Readings (15 min) Evaluation   1 Alternaria alternata (tenuis)  +/++    2 Cladosporium herbarum -    3 Aspergillus fumigatus -    4 Penicillium notatum -    5 Dermatophagoides pteronyssinus +/++    6 Dermatophagoides farinae -    7 Dog epithelium (canis spp) -    8 Cat hair (brian catus) +    9 Cockroach   (Blatella americana & germanica) +    10 Grass mix midwest   (Jolanta, Orchard, Redtop, Alejandro) -    11 Franklin grass (sorghum halepense) -    12 Weed mix   (common Cocklebur, Lamb s quarters, rough redroot Pigweed, Dock/Sorrel) -    13 Mug wort (artemisia vulgare) -    14 Ragweed giant/short (ambrosia spp) -    15 White birch (Betula papyrifera) -    16 Tree mix 1 (Pecan, Maple BHR, Oak RVW, american Bellwood, black [place]) -    17 Red cedar (juniperus virginia) ++    18 Tree mix 2   (white Gagan, river/red Birch, black Catano, common Hoke, american Elm) ++    19 Box elder/Maple mix (acer spp) -    20 Blackburn shagbark (carya ovata) -           Conclusion      Intradermal Testing (Placed July 5, 2021 )    No Substance Conc.  Readings (15min) Evaluation   DF Standard Dust Mite - D. Farinae 1:10 neg 3mmP/E   A Aspergillus fumigatus  1:10 (+) 5mmP/E   P Penicillium notatum 1:10 neg neg   Conclusion: in IDT slight reation to Aspergillus, but no clear immediate reaction. After 2 days no delayed reaction    [x] No relevant allergic reaction observed    [] Allergic reaction diagnosed against following allergens:      Interpretation/ remarks:   See later    [] Patient information given   [] ACDS CAMP information's (# ....) to following compounds: .....   [] General information's to following compounds: ......      Assessment & Plan:    ==> Final Diagnosis:     # Recurrent dermatitis on extremities and scalp?  DDx: ACD vs atopic dermatitis  * chronic, active    # Suspicion for atopic predisposition with    Perennial rhinoconjunctivitis,  otitis and pharyngitis with sensitization to house dust mites    seasonal aggravation in spring with sensitization to tree pollens and fall sensitization to seasonal mold alternaria    Recurrent dermatitis  * chronic, active    These conclusions are made at the best of one's knowledge and belief based on the provided evidence such as patient's history and allergy test results and they can change over time or can be incomplete because of missing information's.    ==> Treatment Plan:  - contiue applying Protopic ointment to active plaques Monday through Friday and clobetasol ointment Saturday through Sunday     Staff: : provider    Follow-up in Derm-Allergy clinic for 2nd readings and final conclusions after 4 days (in person)     ___________________________    Start time: 12:38 am  End time: 1253 am    I spent a total of 15 minutes with Rajni Lott during today s  visit. This time was spent discussing all the individual test results, correlating them to the clinical relevance, counseling the patient and/or coordinating care and performing allergy tests or procedures.

## 2021-07-08 NOTE — PROGRESS NOTES
[facility] D[facility]ato-[facility]ergology Note  Office visit  Encounter Date: Jul 9, 2021  ____________________________________________    CC: No chief complaint on file.      HPI:  Ms. Rajni Lott is a(n) 50 year old female who presents today as a return patient for allergy consultation  - Follow-up in Derm-Allergy clinic for 2nd readings and final conclusions after 4 days (in person)   - otherwise feeling well in usual state of health    Physical exam:  General: In no acute distress, well-developed, well-nourished  Eyes: no conjunctivitis  ENT: no signs of rhinitis   Pulmonary: no wheezing or coughing  Skin:Focused examination of the skin on test sites was performed = see test results below  On the right elbow more infiltrated, not very red dermatitis and eczematous plaques on both inner thighs     Earlier History and Allergy exams:    - She describes a 1-2 year history of recurrent rashes on the elbows, knees arms and lateral breasts that are pruritic in nature but non tender and do not blister or ulcerate. She has clobetasol on hand for when this rash emerges but tries to use it very sparingly due to its high potency. She has no prior history of atopic dermatitis during childhood or asthma however over the past 5 years she has been experiencing more seasonal allergies. She describes itchy ears and throat and watery eyes randomly throughout the year, worse in the Spring and Fall. She takes Zyrtec and Pepcid to treat these allergies. She is also being treated for hair loss with Dr. Allison and denies any type of rash on the scalp similar to the body. She does dye her hair but denies any regular use of styling products. She uses a Zinc shampoo, a paraben and sulfate free conditioner. She also denies any personal or family history of psoriasis. A biopsy of one of these skin plaques showed spongiotic dermatitis with differential including nummular or atopic dermatitis vs ACD.   Telemedicine photographs  reviewed (Date of images: 3/1/20. Image quality and interpretability: acceptable. Location of teledermatologist: [facility] [place]. Start time: 7:25 AM. End time: 7:41 AM)  - Well demarcated pink eczematous plaques on the elbows and knees    Past Medical History:   Patient Active Problem List   Diagnosis     History of hysterectomy     History of deep vein thrombosis (DVT) of lower extremity     MTHFR gene mutation (H)     H/O laminectomy     Past Medical History:   Diagnosis Date     DVT (deep venous thrombosis) (H)      Low back pain      MTHFR mutation (H)      PONV (postoperative nausea and vomiting)        Allergies:  No Known Allergies    Medications:  Current Outpatient Medications   Medication     clindamycin (CLEOCIN T) 1 % external solution     clobetasol (TEMOVATE) 0.05 % external ointment     clobetasol (TEMOVATE) 0.05 % external ointment     clobetasol propionate (CLOBEX) 0.05 % external shampoo     gabapentin 6 % SOLN solution     ketoconazole (NIZORAL) 2 % external shampoo     tacrolimus (PROTOPIC) 0.1 % external ointment     valACYclovir (VALTREX) 1000 mg tablet     No current facility-administered medications for this visit.        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    Family History:  Family History   Problem Relation Age of Onset     Melanoma No family hx of      Skin Cancer No family hx of        Previous Labs, Allergy Tests, Dermatopathology, Imaging:  3/4/20 Skin, left elbow, punch:   - Subtle features of spongiotic dermatitis - (see comment)     COMMENT:   These changes suggest a partially treated eczematous dermatitis.  The   presence of occasional Langerhans' cell   aggregates suggests allergic contact dermatitis, though the differential   diagnosis includes atopic, nummular   or other eczematous processes.  PAS stain is negative for fungal elements.     Referred By: Ami  Hans Allison MD  [address] [facility]  [place] [address]     Allergy Tests:  Past Allergy Test - NONE    Order for Future Allergy Testing:    [] Outpatient  [] Inpatient: [facility]..../ Bed ....       Skin Atopy (atopic dermatitis) [] Yes   [x] No  Contact allergies:   [] Yes   [x] No  Urticaria/Angioedema  [] Yes   [x] No  Rhinitis/Sinusitis:   [] Yes   [x] No   [] seasonal [x] perennial            Allergic Asthma:   [] Yes   [x] No  Pets :  [] Yes   [x] No  which?......  Food Allergy:   [] Yes   [x] No  Drug allergies:    [] Yes   [x] No  Leg ulcers:   [] Yes   [x] No  Hand eczema:   [] Yes   [x] No   Leading hand:   [] R   [] L       [] Ambidextrous                        Order for PATCH TESTS  Reason for tests (suspected allergy): fragrance, hair products?  Known previous allergies: none  Standardized panels  [x] Standard panel (40 tests)  [x] Preservatives & Antimicrobials (31 tests)  [x] Emulsifiers & Additives (25 tests)   [x] Perfumes/Flavours & Plants (25 tests)  [x] Hairdresser panel (12 tests)  [] Rubber Chemicals (22 tests)  [] Plastics (26 tests)  [] Colorants/Dyes/Food additives (20 tests)  [] Metals (implants/dental) (24 tests)  [] Local anaesthetics/NSAIDs (13 tests)  [] Antibiotics & Antimycotics (14 tests)   [] Corticosteroids (15 tests)   [] Photopatch test (62 tests)   [] others: ...       [] Patient's own products: ...    DO NOT test if chemical or biological identity is unknown!     always ask from patient the product information and safety sheets (MSDS)       Order for PRICK TESTS    Reason for tests (suspected allergy): molds, dust mites?  Known previous allergies: none    Standardized prick panels  [x] Atopic panel (20 tests)  [] Pediatric Panel (12 tests)  [] Milk, Meat, Eggs, Grains (20 tests)   [] Dust, Epithelia, Feathers (10 tests)  [] Fish, Seafood, Shellfish (17 tests)  [] Nuts, Beans (14 tests)  [] Spice, Vegetable, Fruit (17 tests)  [] Others: ...      [] Patient's own  products: ...    DO NOT test if chemical or biological identity is unknown!     always ask from patient the product information and safety sheets (MSDS)     Standardized intradermal tests  [x] Penicillium notatum [x] Aspergillus fumigatus [x] House dust mites  [] Bee venom   [] Wasp venom !!Specific protocol with dilutions!!  [] Others: ...    ________________________________    RESULTS & EVALUATION of PATCH TESTS    Patch test readings after     [x] 2 days, [] 3 days [x] 4 days, [] 5 days,    July 5, 2021 application of patch tests with results:    STANDARD Series        # Substance 2 days 4 days remarks    1 Noe Mix [C] - -     2 Colophony - -     3  2-Mercaptobenzothiazole  - -      4 Methylisothiazolinone - -     5 Carba Mix - -     6 Thiuram Mix [A] - -     7 Bisphenol A Epoxy Resin - -     8 B-Rryb-Mkzogsiifgj-Formaldehyde Resin - -     9 Mercapto Mix [A] - -     10 Black Rubber Mix- PPD [B] - -     11 Potassium Dichromate  -  -     12 Balsam of [place] (Myroxylon Pereirae Resin) - -     13 Nickel Sulphate Hexahydrate - -     14 Mixed Dialkyl Thiourea - -     15 Paraben Mix [B] - -     16 Methyldibromo Glutaronitrile - -     17 Fragrance Mix - -     18 2-Bromo-2-Nitropropane-1,3-Diol (Bronopol) CT NA NA     19 Lyral - -     20 Tixocortol-21- Pivalate CT - -     21 Diazolidiyl Urea (Germall II) - -     22 Methyl Methacrylate - -     23 Cobalt (II) Chloride Hexahydrate - -     24 Fragrance Mix II  - -     25 Compositae Mix - -     26 Benzoyl Peroxide - -     27 Bacitracin - -     28 Formaldehyde - -     29 Methylchloroisothiazolinone / Methylisothiazolinone - -     30 Corticosteroid Mix CT - -     31 Sodium Lauryl Sulfate - -     32 Lanolin Alcohol - -     33 Turpentine - -     34 Cetylstearylalcohol - -     35 Chlorhexidine Dicluconate - -     36 Budenoside - -     37 Imidazolidinyl Urea  - -     38 Ethyl-2 Cyanoacrylate - -     39 Quaternium 15 (Dowicil 200) - -     40 Decyl Glucoside - -    PRESERVATIVES &  ANTIMICROBIALS        # Substance 2 days 4 days remarks   41 1  1,2-Benzisothiazoline-3-One, Sodium Salt - -     2  1,3,5-Diego (2-Hydroxyethyl) - Hexahydrotriazine (Grotan BK) - -     3 7-Jadbvukeavzya-2-Nitro-1, 3-Propanediol Na NA     4  3, 4, 4' - Triclocarban - -    45 5 4 - Chloro - 3 - Cresol - -     6 4 - Chloro - 4 - Xylenol (PCMX) - -     7 7-Ethylbicyclooxazolidine (Bioban CQ3526) - -     8 Benzalkonium Chloride CT NA NA     9 Benzyl Alcohol - -    50 10 Cetalkonium Chloride - -     11 Cetylpyrimidine Chloride  - -     12 Chloroacetamide - -     13 DMDM Hydantoin - -     14 Glutaraldehyde - -    55 15 Triclosan - -     16 Glyoxal Trimeric Dihydrate - -     17 Iodopropynyl Butylcarbamate - -     18 Octylisothiazoline - -     19 Bithionol CT - -    60 20 Bioban P 1487 (Nitrobutyl) Morpholine/(Ethylnitro-Trimethylene) Dimorpholine - -     21 Phenoxyethanol - -     22 Phenyl Salicylate - -     23 Povidone Iodine - -     24 Sodium Benzoate - -    65 25 Sodium Disulfite - -     26 Sorbic Acid - -     27 Thimerosal       28 Melamine Formaldehyde Resin       29 Ethylenediamine Dihydrochloride        Parabens      70 30 Butyl-P-Hydroxybenzoate - -     31 Ethyl-P-Hydroxybenzoate - -     32 Methyl-P-Hydroxybenzoate - -    73 33 Propyl-P-Hydroxybenzoate - -    EMULSIFIERS & ADDITIVES       # Substance 2 days 4 days remarks   74 1 Polyethylene Glycol-400 NA NA    75 2 Cocamidopropyl Betaine NA NA     3 Amerchol L101 - -     4 Propylene Glycol - -     5 Triethanolamine - -     6 Sorbitane Sesquiolate CT - -    80 7 Isopropylmyristate - -     8 Polysorbate 80 CT - -     9 Amidoamine   (Stearamidopropyl Dimethylamine) - -     10 Oleamidopropyl Dimethylamine - -     11 Lauryl Glucoside - -    85 12 Coconut Diethanolamide  - -     13 2-Hydroxy-4-Methoxy Benzophenone (Oxybenzone) - -     14 Benzophenone-4 (Sulisobenzon) - -     15 Propolis - -     16 Dexpanthenol - -    90 17 Carboxymethyl Cellulose Sodium - -     18 Abitol  - -     19 Tert-Butylhydroquinone - -     20 Benzyl Salicylate - -     21 Dimethylaminopropylamin (DMPA) CT? D053      95 22 Zinc Pyrithione (Zinc Omadine) CT? Z006       23 Diego(Hydroxymethyl) Nitromethane CT        Antioxidant - -     24 Dodecyl Gallate - -     25 Butylhydroxyanisole (BHA) - -     26 Butylhydroxytoluene (BHT) - -    100 27 Di-Alpha-Tocopherol (Vit E) - -    101 28 Propyl Gallate - -    PERFUMES, FLAVORS & PLANTS        # Substance 2 days 4 days remarks   102 1 Benzyl Cinnamate - -     2 Di-Limonene (Dipentene) - -     3 Cananga Odorata (Maricel Connelly) (I) - -    105 4 Lichen Acid Mix - -     5 Mentha Piperita Oil (Peppermint Oil) - -     6 Sesquiterpenelactone mix - -     7 Tea Tree Oil, Oxidized - -     8 Wood Tar Mix - -    110 9 Abietic Acid - -     10 Lavendula Angustifolia Oil (Lavender Oil) - -     11 Fragrance mix II CT * - -      Fragrance Mix I       12 Oakmoss Absolute - -     13 Eugenol - -    115 14 Geraniol - -     15 Hydroxycitronellal - -     16 Isoeugenol - -     17 Cinnamic Aldehyde - -     18 Cinnamic Alcohol  - -      Fragrance mix II      120 19 Citronellol - -     20 Alpha-Hexylcinnamic Aldehyde    - -     21 Citral - -     22 Farnesol - -    124 23 Coumarin NA NA      Hexylcinnamic aldehyde, Coumarin, Farnesol, Hydroxyisohexy3-cyclohexene carboxaldehyde, citral, citrolellol  HAIRDRESSER        # Substance 2 days 4 days remarks   125 1 P-Phenylenediamin  -  -     2 P-Toluenediamine Sulphate  -  -     3 Ammonium Thioglycolate - -     4 Ammonium Persulfate - -     5 Resorcinol - -    130 6 3-Aminophenol - -     7 P-Aminophenol - -     8 Glyceryl Monothioglycolate - -     9 Pyrogallol - -    134 10 P-Aminodiphenylamine - -      Results of patch tests:                         Interpretation:  - Negative                    A    = Allergic      (+) Erythema    TI   = Toxic/irritant   + E + Infiltration    RaP = Relevance at Present     ++ E/I + Papulovesicle   Rpr  = Relevance  Previously     +++ E/I/P + Blister     nR   = No Relevance    Atopy Screen (Placed July 5, 2021 )    No Substance Readings (15 min) Evaluation   POS Histamine 1mg/ml +/++    NEG NaCl 0.9% -      No Substance Readings (15 min) Evaluation   1 Alternaria alternata (tenuis)  +/++    2 Cladosporium herbarum -    3 Aspergillus fumigatus -    4 Penicillium notatum -    5 Dermatophagoides pteronyssinus +/++    6 Dermatophagoides farinae -    7 Dog epithelium (canis spp) -    8 Cat hair (brian catus) +    9 Cockroach   (Blatella americana & germanica) +    10 Grass mix midwest   (Jolanta, Orchard, Redtop, Alejandro) -    11 Franklin grass (sorghum halepense) -    12 Weed mix   (common Cocklebur, Lamb s quarters, rough redroot Pigweed, Dock/Sorrel) -    13 Mug wort (artemisia vulgare) -    14 Ragweed giant/short (ambrosia spp) -    15 White birch (Betula papyrifera) -    16 Tree mix 1 (Pecan, Maple BHR, Oak RVW, american [place], black [place]) -    17 Red cedar (juniperus virginia) ++    18 Tree mix 2   (white Gagan, river/red Birch, black [place], common [place], american Elm) ++    19 Box elder/Maple mix (acer spp) -    20 [place] shagbark (carya ovata) -           Conclusion: patient is atopic with sensitization to house dust mite and Alternaria mold and tree pollens     Intradermal Testing (Placed July 5, 2021 )    No Substance Conc.  Readings (15min) Evaluation   DF Standard Dust Mite - D. Farinae 1:10 neg 3mmP/E   A Aspergillus fumigatus  1:10 (+) 5mmP/E   P Penicillium notatum 1:10 neg neg   Conclusion: in IDT slight reation to Aspergillus, but no clear immediate reaction. After 2 and 4 days no delayed reaction    [x] No relevant allergic reaction observed    [] Allergic reaction diagnosed against following allergens:      Interpretation/ remarks:   No relevant sensitization in patch tests, but based on positive prick tests and histology it is an eczematous dermatitis and most likely atopic dermatitis    [x] Patient  information given   [] ACDS CAMP information's (# ....) to following compounds: .....   [x] General information's to following compounds: info given HDM reduction      Assessment & Plan:    ==> Final Diagnosis:     # Recurrent dermatitis on extremities and scalp    No specific allergic contact sensitization in patch tests    With positive prick tests most likely atopic dermatitis   DDx psoriasiform, but histology speaks against  * chronic, active    # Suspicion for atopic predisposition with    Perennial rhinoconjunctivitis, otitis and pharyngitis with sensitization to house dust mites    seasonal aggravation in spring with sensitization to tree pollens and fall sensitization to seasonal mold alternaria    Recurrent dermatitis  * chronic, active    These conclusions are made at the best of one's knowledge and belief based on the provided evidence such as patient's history and allergy test results and they can change over time or can be incomplete because of missing information's.    ==> Treatment Plan:  > reduce house dust mites at home (info given)  >> try every evening 1 Tabl Cetirizine 10mg  >> moisturize the skin as much as possible, use products with not too much fragrances, essential oils (irritants)  >> contiue applying Protopic ointment to active plaques Monday through Friday and Clobetasol or Mometasone ointment Saturday through Sunday and if fresh lesions use for 3-4 days daily Clobetasole or Mometasone and then change back to Protopic    >> Dupixent (Dupilumab) in reserve if topical treatment not enough  >> if there is a strong indication for house dust mite sensitization, we could discuss an immunotherapy (allergy shots) to house dust mites       Staff: : provider      Follow-up in Derm-Allergy clinic if necessary  ___________________________    I spent a total of 30 minutes with Rajni Lott during today s  visit. This time was spent discussing all the individual test results, correlating them to the  clinical relevance, counseling the patient and/or coordinating care.

## 2021-07-09 ENCOUNTER — OFFICE VISIT (OUTPATIENT)
Dept: ALLERGY | Facility: CLINIC | Age: 51
End: 2021-07-09
Payer: COMMERCIAL

## 2021-07-09 DIAGNOSIS — J30.89 SEASONAL AND PERENNIAL ALLERGIC RHINOCONJUNCTIVITIS: ICD-10-CM

## 2021-07-09 DIAGNOSIS — H10.10 SEASONAL AND PERENNIAL ALLERGIC RHINOCONJUNCTIVITIS: ICD-10-CM

## 2021-07-09 DIAGNOSIS — H65.413 CHRONIC ALLERGIC OTITIS MEDIA OF BOTH EARS: ICD-10-CM

## 2021-07-09 DIAGNOSIS — L20.89 OTHER ATOPIC DERMATITIS: Primary | ICD-10-CM

## 2021-07-09 DIAGNOSIS — Z91.09 HOUSE DUST MITE ALLERGY: ICD-10-CM

## 2021-07-09 DIAGNOSIS — J30.2 SEASONAL AND PERENNIAL ALLERGIC RHINOCONJUNCTIVITIS: ICD-10-CM

## 2021-07-09 PROCEDURE — 99214 OFFICE O/P EST MOD 30 MIN: CPT | Performed by: DERMATOLOGY

## 2021-07-09 RX ORDER — MOMETASONE FUROATE 1 MG/G
OINTMENT TOPICAL
Qty: 45 G | Refills: 3 | Status: SHIPPED | OUTPATIENT
Start: 2021-07-12 | End: 2024-07-01

## 2021-07-09 NOTE — LETTER
7/9/2021         RE: Rajni Lott  [address] [address]        Dear Colleague,    Thank you for referring your patient, Rajni Lott, to the [facility] [place]. Please see a copy of my visit note below.    [facility] Dermato-allergology Note  Office visit  Encounter Date: Jul 9, 2021  ____________________________________________    CC: No chief complaint on file.      HPI:  Ms. Rajni Lott is a(n) 50 year old female who presents today as a return patient for allergy consultation  - Follow-up in Derm-Allergy clinic for 2nd readings and final conclusions after 4 days (in person)   - otherwise feeling well in usual state of health    Physical exam:  General: In no acute distress, well-developed, well-nourished  Eyes: no conjunctivitis  ENT: no signs of rhinitis   Pulmonary: no wheezing or coughing  Skin:Focused examination of the skin on test sites was performed = see test results below  On the right elbow more infiltrated, not very red dermatitis and eczematous plaques on both inner thighs     Earlier History and Allergy exams:    - She describes a 1-2 year history of recurrent rashes on the elbows, knees arms and lateral breasts that are pruritic in nature but non tender and do not blister or ulcerate. She has clobetasol on hand for when this rash emerges but tries to use it very sparingly due to its high potency. She has no prior history of atopic dermatitis during childhood or asthma however over the past 5 years she has been experiencing more seasonal allergies. She describes itchy ears and throat and watery eyes randomly throughout the year, worse in the Spring and Fall. She takes Zyrtec and Pepcid to treat these allergies. She is also being treated for hair loss with Dr. Allison and denies any type of rash on the scalp similar to the body. She does dye her hair but denies any regular use of styling products. She uses a Zinc shampoo, a  paraben and sulfate free conditioner. She also denies any personal or family history of psoriasis. A biopsy of one of these skin plaques showed spongiotic dermatitis with differential including nummular or atopic dermatitis vs ACD.   Telemedicine photographs reviewed (Date of images: 3/1/20. Image quality and interpretability: acceptable. Location of teledermatologist: [facility]. Start time: 7:25 AM. End time: 7:41 AM)  - Well demarcated pink eczematous plaques on the elbows and knees    Past Medical History:   Patient Active Problem List   Diagnosis     History of hysterectomy     History of deep vein thrombosis (DVT) of lower extremity     MTHFR gene mutation (H)     H/O laminectomy     Past Medical History:   Diagnosis Date     DVT (deep venous thrombosis) (H)      Low back pain      MTHFR mutation (H)      PONV (postoperative nausea and vomiting)        Allergies:  No Known Allergies    Medications:  Current Outpatient Medications   Medication     clindamycin (CLEOCIN T) 1 % external solution     clobetasol (TEMOVATE) 0.05 % external ointment     clobetasol (TEMOVATE) 0.05 % external ointment     clobetasol propionate (CLOBEX) 0.05 % external shampoo     gabapentin 6 % SOLN solution     ketoconazole (NIZORAL) 2 % external shampoo     tacrolimus (PROTOPIC) 0.1 % external ointment     valACYclovir (VALTREX) 1000 mg tablet     No current facility-administered medications for this visit.        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    Family History:  Family History   Problem Relation Age of Onset     Melanoma No family hx of      Skin Cancer No family hx of        Previous Labs, Allergy Tests, Dermatopathology, Imaging:  3/4/20 Skin, left elbow, punch:   - Subtle features of spongiotic dermatitis - (see comment)     COMMENT:   These changes suggest a partially treated eczematous dermatitis.   The   presence of occasional Langerhans' cell   aggregates suggests allergic contact dermatitis, though the differential   diagnosis includes atopic, nummular   or other eczematous processes.  PAS stain is negative for fungal elements.     Referred By: Ami Allison MD  [address]  [place] [address]     Allergy Tests:  Past Allergy Test - NONE    Order for Future Allergy Testing:    [] Outpatient  [] Inpatient: [facility]..../ Bed ....       Skin Atopy (atopic dermatitis) [] Yes   [x] No  Contact allergies:   [] Yes   [x] No  Urticaria/Angioedema  [] Yes   [x] No  Rhinitis/Sinusitis:   [] Yes   [x] No   [] seasonal [x] perennial            Allergic Asthma:   [] Yes   [x] No  Pets :  [] Yes   [x] No  which?......  Food Allergy:   [] Yes   [x] No  Drug allergies:    [] Yes   [x] No  Leg ulcers:   [] Yes   [x] No  Hand eczema:   [] Yes   [x] No   Leading hand:   [] R   [] L       [] Ambidextrous                        Order for PATCH TESTS  Reason for tests (suspected allergy): fragrance, hair products?  Known previous allergies: none  Standardized panels  [x] Standard panel (40 tests)  [x] Preservatives & Antimicrobials (31 tests)  [x] Emulsifiers & Additives (25 tests)   [x] Perfumes/Flavours & Plants (25 tests)  [x] Hairdresser panel (12 tests)  [] Rubber Chemicals (22 tests)  [] Plastics (26 tests)  [] Colorants/Dyes/Food additives (20 tests)  [] Metals (implants/dental) (24 tests)  [] Local anaesthetics/NSAIDs (13 tests)  [] Antibiotics & Antimycotics (14 tests)   [] Corticosteroids (15 tests)   [] Photopatch test (62 tests)   [] others: ...       [] Patient's own products: ...    DO NOT test if chemical or biological identity is unknown!     always ask from patient the product information and safety sheets (MSDS)       Order for PRICK TESTS    Reason for tests (suspected allergy): molds, dust mites?  Known previous allergies: none    Standardized prick panels  [x] Atopic panel (20  tests)  [] Pediatric Panel (12 tests)  [] Milk, Meat, Eggs, Grains (20 tests)   [] Dust, Epithelia, Feathers (10 tests)  [] Fish, Seafood, Shellfish (17 tests)  [] Nuts, Beans (14 tests)  [] Spice, Vegetable, Fruit (17 tests)  [] Others: ...      [] Patient's own products: ...    DO NOT test if chemical or biological identity is unknown!     always ask from patient the product information and safety sheets (MSDS)     Standardized intradermal tests  [x] Penicillium notatum [x] Aspergillus fumigatus [x] House dust mites  [] Bee venom   [] Wasp venom !!Specific protocol with dilutions!!  [] Others: ...    ________________________________    RESULTS & EVALUATION of PATCH TESTS    Patch test readings after     [x] 2 days, [] 3 days [x] 4 days, [] 5 days,    July 5, 2021 application of patch tests with results:    STANDARD Series        # Substance 2 days 4 days remarks    1 Noe Mix [C] - -     2 Colophony - -     3  2-Mercaptobenzothiazole  - -      4 Methylisothiazolinone - -     5 Carba Mix - -     6 Thiuram Mix [A] - -     7 Bisphenol A Epoxy Resin - -     8 H-Zlas-Fqpqnfjvogs-Formaldehyde Resin - -     9 Mercapto Mix [A] - -     10 Black Rubber Mix- PPD [B] - -     11 Potassium Dichromate  -  -     12 Balsam of [place] (Myroxylon Pereirae Resin) - -     13 Nickel Sulphate Hexahydrate - -     14 Mixed Dialkyl Thiourea - -     15 Paraben Mix [B] - -     16 Methyldibromo Glutaronitrile - -     17 Fragrance Mix - -     18 2-Bromo-2-Nitropropane-1,3-Diol (Bronopol) CT NA NA     19 Lyral - -     20 Tixocortol-21- Pivalate CT - -     21 Diazolidiyl Urea (Germall II) - -     22 Methyl Methacrylate - -     23 Cobalt (II) Chloride Hexahydrate - -     24 Fragrance Mix II  - -     25 Compositae Mix - -     26 Benzoyl Peroxide - -     27 Bacitracin - -     28 Formaldehyde - -     29 Methylchloroisothiazolinone / Methylisothiazolinone - -     30 Corticosteroid Mix CT - -     31 Sodium Lauryl Sulfate - -     32 Lanolin Alcohol - -      33 Turpentine - -     34 Cetylstearylalcohol - -     35 Chlorhexidine Dicluconate - -     36 Budenoside - -     37 Imidazolidinyl Urea  - -     38 Ethyl-2 Cyanoacrylate - -     39 Quaternium 15 (Dowicil 200) - -     40 Decyl Glucoside - -    PRESERVATIVES & ANTIMICROBIALS        # Substance 2 days 4 days remarks   41 1  1,2-Benzisothiazoline-3-One, Sodium Salt - -     2  1,3,5-Diego (2-Hydroxyethyl) - Hexahydrotriazine (Grotan BK) - -     3 0-Gpxtegxcwzgyv-7-Nitro-1, 3-Propanediol Na NA     4  3, 4, 4' - Triclocarban - -    45 5 4 - Chloro - 3 - Cresol - -     6 4 - Chloro - 4 - Xylenol (PCMX) - -     7 7-Ethylbicyclooxazolidine (Bioban SR1832) - -     8 Benzalkonium Chloride CT NA NA     9 Benzyl Alcohol - -    50 10 Cetalkonium Chloride - -     11 Cetylpyrimidine Chloride  - -     12 Chloroacetamide - -     13 DMDM Hydantoin - -     14 Glutaraldehyde - -    55 15 Triclosan - -     16 Glyoxal Trimeric Dihydrate - -     17 Iodopropynyl Butylcarbamate - -     18 Octylisothiazoline - -     19 Bithionol CT - -    60 20 Bioban P 1487 (Nitrobutyl) Morpholine/(Ethylnitro-Trimethylene) Dimorpholine - -     21 Phenoxyethanol - -     22 Phenyl Salicylate - -     23 Povidone Iodine - -     24 Sodium Benzoate - -    65 25 Sodium Disulfite - -     26 Sorbic Acid - -     27 Thimerosal       28 Melamine Formaldehyde Resin       29 Ethylenediamine Dihydrochloride        Parabens      70 30 Butyl-P-Hydroxybenzoate - -     31 Ethyl-P-Hydroxybenzoate - -     32 Methyl-P-Hydroxybenzoate - -    73 33 Propyl-P-Hydroxybenzoate - -    EMULSIFIERS & ADDITIVES       # Substance 2 days 4 days remarks   74 1 Polyethylene Glycol-400 NA NA    75 2 Cocamidopropyl Betaine NA NA     3 Amerchol L101 - -     4 Propylene Glycol - -     5 Triethanolamine - -     6 Sorbitane Sesquiolate CT - -    80 7 Isopropylmyristate - -     8 Polysorbate 80 CT - -     9 Amidoamine   (Stearamidopropyl Dimethylamine) - -     10 Oleamidopropyl Dimethylamine - -      11 Lauryl Glucoside - -    85 12 Coconut Diethanolamide  - -     13 2-Hydroxy-4-Methoxy Benzophenone (Oxybenzone) - -     14 Benzophenone-4 (Sulisobenzon) - -     15 Propolis - -     16 Dexpanthenol - -    90 17 Carboxymethyl Cellulose Sodium - -     18 Abitol - -     19 Tert-Butylhydroquinone - -     20 Benzyl Salicylate - -     21 Dimethylaminopropylamin (DMPA) CT? D053      95 22 Zinc Pyrithione (Zinc Omadine) CT? Z006       23 Diego(Hydroxymethyl) Nitromethane CT        Antioxidant - -     24 Dodecyl Gallate - -     25 Butylhydroxyanisole (BHA) - -     26 Butylhydroxytoluene (BHT) - -    100 27 Di-Alpha-Tocopherol (Vit E) - -    101 28 Propyl Gallate - -    PERFUMES, FLAVORS & PLANTS        # Substance 2 days 4 days remarks   102 1 Benzyl Cinnamate - -     2 Di-Limonene (Dipentene) - -     3 Cananga Odorata (Maricel Connelly) (I) - -    105 4 Lichen Acid Mix - -     5 Mentha Piperita Oil (Peppermint Oil) - -     6 Sesquiterpenelactone mix - -     7 Tea Tree Oil, Oxidized - -     8 Wood Tar Mix - -    110 9 Abietic Acid - -     10 Lavendula Angustifolia Oil (Lavender Oil) - -     11 Fragrance mix II CT * - -      Fragrance Mix I       12 Oakmoss Absolute - -     13 Eugenol - -    115 14 Geraniol - -     15 Hydroxycitronellal - -     16 Isoeugenol - -     17 Cinnamic Aldehyde - -     18 Cinnamic Alcohol  - -      Fragrance mix II      120 19 Citronellol - -     20 Alpha-Hexylcinnamic Aldehyde    - -     21 Citral - -     22 Farnesol - -    124 23 Coumarin NA NA      Hexylcinnamic aldehyde, Coumarin, Farnesol, Hydroxyisohexy3-cyclohexene carboxaldehyde, citral, citrolellol  HAIRDRESSER        # Substance 2 days 4 days remarks   125 1 P-Phenylenediamin  -  -     2 P-Toluenediamine Sulphate  -  -     3 Ammonium Thioglycolate - -     4 Ammonium Persulfate - -     5 Resorcinol - -    130 6 3-Aminophenol - -     7 P-Aminophenol - -     8 Glyceryl Monothioglycolate - -     9 Pyrogallol - -    134 10 P-Aminodiphenylamine - -       Results of patch tests:                         Interpretation:  - Negative                    A    = Allergic      (+) Erythema    TI   = Toxic/irritant   + E + Infiltration    RaP = Relevance at Present     ++ E/I + Papulovesicle   Rpr  = Relevance Previously     +++ E/I/P + Blister     nR   = No Relevance    Atopy Screen (Placed July 5, 2021 )    No Substance Readings (15 min) Evaluation   POS Histamine 1mg/ml +/++    NEG NaCl 0.9% -      No Substance Readings (15 min) Evaluation   1 Alternaria alternata (tenuis)  +/++    2 Cladosporium herbarum -    3 Aspergillus fumigatus -    4 Penicillium notatum -    5 Dermatophagoides pteronyssinus +/++    6 Dermatophagoides farinae -    7 Dog epithelium (canis spp) -    8 Cat hair (brian catus) +    9 Cockroach   (Blatella americana & germanica) +    10 Grass mix midwest   (Jolanta, Orchard, Redtop, Alejandro) -    11 Franklin grass (sorghum halepense) -    12 Weed mix   (common Cocklebur, Lamb s quarters, rough redroot Pigweed, Dock/Sorrel) -    13 Mug wort (artemisia vulgare) -    14 Ragweed giant/short (ambrosia spp) -    15 White birch (Betula papyrifera) -    16 Tree mix 1 (Pecan, Maple BHR, Oak RVW, american [place], black [place]) -    17 Red cedar (juniperus virginia) ++    18 Tree mix 2   (white Gagan, river/red Birch, black [place], common Casselberry, american Elm) ++    19 Box elder/Maple mix (acer spp) -    20 [place] shagbark (carya ovata) -           Conclusion: patient is atopic with sensitization to house dust mite and Alternaria mold and tree pollens     Intradermal Testing (Placed July 5, 2021 )    No Substance Conc.  Readings (15min) Evaluation   DF Standard Dust Mite - D. Farinae 1:10 neg 3mmP/E   A Aspergillus fumigatus  1:10 (+) 5mmP/E   P Penicillium notatum 1:10 neg neg   Conclusion: in IDT slight reation to Aspergillus, but no clear immediate reaction. After 2 and 4 days no delayed reaction    [x] No relevant allergic reaction observed    [] Allergic  reaction diagnosed against following allergens:      Interpretation/ remarks:   No relevant sensitization in patch tests, but based on positive prick tests and histology it is an eczematous dermatitis and most likely atopic dermatitis    [x] Patient information given   [] ACDS CAMP information's (# ....) to following compounds: .....   [x] General information's to following compounds: info given HDM reduction      Assessment & Plan:    ==> Final Diagnosis:     # Recurrent dermatitis on extremities and scalp    No specific allergic contact sensitization in patch tests    With positive prick tests most likely atopic dermatitis   DDx psoriasiform, but histology speaks against  * chronic, active    # Suspicion for atopic predisposition with    Perennial rhinoconjunctivitis, otitis and pharyngitis with sensitization to house dust mites    seasonal aggravation in spring with sensitization to tree pollens and fall sensitization to seasonal mold alternaria    Recurrent dermatitis  * chronic, active    These conclusions are made at the best of one's knowledge and belief based on the provided evidence such as patient's history and allergy test results and they can change over time or can be incomplete because of missing information's.    ==> Treatment Plan:  > reduce house dust mites at home (info given)  >> try every evening 1 Tabl Cetirizine 10mg  >> moisturize the skin as much as possible, use products with not too much fragrances, essential oils (irritants)  >> contiue applying Protopic ointment to active plaques Monday through Friday and Clobetasol or Mometasone ointment Saturday through Sunday and if fresh lesions use for 3-4 days daily Clobetasole or Mometasone and then change back to Protopic    >> Dupixent (Dupilumab) in reserve if topical treatment not enough  >> if there is a strong indication for house dust mite sensitization, we could discuss an immunotherapy (allergy shots) to house dust mites       Staff: :  provider      Follow-up in Derm-Allergy clinic if necessary  ___________________________    I spent a total of 30 minutes with Rajni Lott during today s  visit. This time was spent discussing all the individual test results, correlating them to the clinical relevance, counseling the patient and/or coordinating care.           Again, thank you for allowing me to participate in the care of your patient.        Sincerely,        Trace Marcos MD

## 2021-07-09 NOTE — NURSING NOTE
Chief Complaint   Patient presents with     Allergy Testing Followup     Patch testing day 5      Melissa Fletcher RN

## 2021-10-02 ENCOUNTER — HEALTH MAINTENANCE LETTER (OUTPATIENT)
Age: 51
End: 2021-10-02

## 2021-11-03 ENCOUNTER — MYC MEDICAL ADVICE (OUTPATIENT)
Dept: VASCULAR SURGERY | Facility: CLINIC | Age: 51
End: 2021-11-03
Payer: COMMERCIAL

## 2021-11-03 DIAGNOSIS — Z20.822 SUSPECTED 2019 NOVEL CORONAVIRUS INFECTION: Primary | ICD-10-CM

## 2021-12-22 ENCOUNTER — LAB (OUTPATIENT)
Dept: FAMILY MEDICINE | Facility: CLINIC | Age: 51
End: 2021-12-22
Attending: SURGERY
Payer: COMMERCIAL

## 2021-12-22 DIAGNOSIS — Z20.822 COVID-19 RULED OUT: Primary | ICD-10-CM

## 2021-12-22 DIAGNOSIS — Z20.822 COVID-19 RULED OUT: ICD-10-CM

## 2021-12-22 DIAGNOSIS — Z11.52 ENCOUNTER FOR PREOPERATIVE SCREENING LABORATORY TESTING FOR COVID-19 VIRUS: Primary | ICD-10-CM

## 2021-12-22 DIAGNOSIS — R06.2 WHEEZING: ICD-10-CM

## 2021-12-22 DIAGNOSIS — Z01.812 ENCOUNTER FOR PREOPERATIVE SCREENING LABORATORY TESTING FOR COVID-19 VIRUS: Primary | ICD-10-CM

## 2021-12-22 LAB — SARS-COV-2 RNA RESP QL NAA+PROBE: NEGATIVE

## 2021-12-22 PROCEDURE — U0003 INFECTIOUS AGENT DETECTION BY NUCLEIC ACID (DNA OR RNA); SEVERE ACUTE RESPIRATORY SYNDROME CORONAVIRUS 2 (SARS-COV-2) (CORONAVIRUS DISEASE [COVID-19]), AMPLIFIED PROBE TECHNIQUE, MAKING USE OF HIGH THROUGHPUT TECHNOLOGIES AS DESCRIBED BY CMS-2020-01-R: HCPCS

## 2021-12-22 PROCEDURE — U0005 INFEC AGEN DETEC AMPLI PROBE: HCPCS

## 2021-12-28 ENCOUNTER — LAB (OUTPATIENT)
Dept: FAMILY MEDICINE | Facility: CLINIC | Age: 51
End: 2021-12-28
Attending: NURSE PRACTITIONER
Payer: COMMERCIAL

## 2021-12-28 ENCOUNTER — TELEPHONE (OUTPATIENT)
Dept: FAMILY MEDICINE | Facility: CLINIC | Age: 51
End: 2021-12-28

## 2021-12-28 DIAGNOSIS — Z20.822 SUSPECTED 2019 NOVEL CORONAVIRUS INFECTION: ICD-10-CM

## 2021-12-28 LAB — SARS-COV-2 RNA RESP QL NAA+PROBE: POSITIVE

## 2021-12-28 PROCEDURE — U0005 INFEC AGEN DETEC AMPLI PROBE: HCPCS

## 2021-12-28 PROCEDURE — U0003 INFECTIOUS AGENT DETECTION BY NUCLEIC ACID (DNA OR RNA); SEVERE ACUTE RESPIRATORY SYNDROME CORONAVIRUS 2 (SARS-COV-2) (CORONAVIRUS DISEASE [COVID-19]), AMPLIFIED PROBE TECHNIQUE, MAKING USE OF HIGH THROUGHPUT TECHNOLOGIES AS DESCRIBED BY CMS-2020-01-R: HCPCS

## 2021-12-29 NOTE — TELEPHONE ENCOUNTER
"-Coronavirus (COVID-19) Notification    Caller Name (Patient, parent, daughter/son, grandparent, etc)  Patient  Reason for call  Notify of Positive Coronavirus (COVID-19) lab results, assess symptoms,  review  [facility] recommendations    Lab Result    Lab test:  2019-nCoV rRt-PCR or SARS-CoV-2 PCR    Oropharyngeal AND/OR nasopharyngeal swabs is POSITIVE for 2019-nCoV RNA/SARS-COV-2 PCR (COVID-19 virus)    RN Recommendations/Instructions per [facility] Coronavirus COVID-19 recommendations    Brief introduction script  Introduce self then review script:  \"I am calling on behalf of [employer].  We were notified that your Coronavirus test (COVID-19) for was POSITIVE for the virus.  I have some information to relay to you but first I wanted to mention that the [place] Dept of Health will be contacting you shortly [it's possible MD already called Patient] to talk to you more about how you are feeling and other people you have had contact with who might now also have the virus.  Also,  [employer] is Partnering with the [facility] for Covid-19 research, you may be contacted directly by research staff.\"    Assessment (Inquire about Patient's current symptoms)   Assessment   Current Symptoms at time of phone call: (if no symptoms, document No symptoms] Notified/instructed by care team per patient   Symptoms onset (if applicable) 12/23/2021     If at time of call, Patients symptoms hare worsened, the Patient should contact 911 or have someone drive them to Emergency Dept promptly:      If Patient calling 911, inform 911 personal that you have tested positive for the Coronavirus (COVID-19).  Place mask on and await 911 to arrive.    If Emergency Dept, If possible, please have another adult drive you to the Emergency Dept but you need to wear mask when in contact with other people.      Monoclonal Antibody Administration    You may be eligible to receive a new treatment with a monoclonal antibody for " "preventing hospitalization in patients at high risk for complications from COVID-19.   This medication is still experimental and available on a limited basis; it is given through an IV and must be given at an infusion center. Please note that not all people who are eligible will receive the medication since it is in limited supply.     Are you interested in being considered for this medication?  No.   Does the patient fit the criteria: No    If patient qualifies based on above criteria:  \"You will be contacted if you are selected to receive this treatment in the next 1-2 business days.   This is time sensitive and if you are not selected in the next 1-2 business days, you will not receive the medication.  If you do not receive a call to schedule, you have not been selected.\"      Review information with Patient    Your result was positive. This means you have COVID-19 (coronavirus).  We have sent you a letter that reviews the information that I'll be reviewing with you now.    How can I protect others?    If you have symptoms: stay home and away from others (self-isolate) until:    You've had no fever--and no medicine that reduces fever--for 1 full day (24 hours). And       Your other symptoms have gotten better. For example, your cough or breathing has improved. And     At least 10 days have passed since your symptoms started. (If you've been told by a doctor that you have a weak immune system, wait 20 days.)     If you don't have symptoms: Stay home and away from others (self-isolate) until at least 10 days have passed since your first positive COVID-19 test. (Date test collected)    During this time:    Stay in your own room, including for meals. Use your own bathroom if you can.    Stay away from others in your home. No hugging, kissing or shaking hands. No visitors.     Don't go to work, school or anywhere else.     Clean  high touch  surfaces often (doorknobs, counters, handles, etc.). Use a household cleaning " spray or wipes. You'll find a full list on the EPA website at www.epa.gov/pesticide-registration/list-n-disinfectants-use-against-sars-cov-2.     Cover your mouth and nose with a mask, tissue or other face covering to avoid spreading germs.    Wash your hands and face often with soap and water.    Make a list of people you have been in close contact with recently, even if either of you wore a face covering.   ; Start your list from 2 days before you became ill or had a positive test.  ; Include anyone that was within 6 feet of you for a cumulative total of 15 minutes or more in 24 hours. (Example: if you sat next to Arvind for 5 minutes in the morning and 10 minutes in the afternoon, then you were in close contact for 15 minutes total that day. Arvind would be added to your list.)    A public health worker will call or text you. It is important that you answer. They will ask you questions about possible exposures to COVID-19, such as people you have been in direct contact with and places you have visited.    Tell the people on your list that you have COVID-19; they should stay away from others for 14 days starting from the last time they were in contact with you (unless you are told something different from a public health worker).     Caregivers in these groups are at risk for severe illness due to COVID-19:  o People 65 years and older  o People who live in a nursing home or long-term care facility  o People with chronic disease (lung, heart, cancer, diabetes, kidney, liver, immunologic)  o People who have a weakened immune system, including those who:  - Are in cancer treatment  - Take medicine that weakens the immune system, such as corticosteroids  - Had a bone marrow or organ transplant  - Have an immune deficiency  - Have poorly controlled HIV or AIDS  - Are obese (body mass index of 40 or higher)  - Smoke regularly    Caregivers should wear gloves while washing dishes, handling laundry and cleaning bedrooms and  bathrooms.    Wash and dry laundry with special caution. Don't shake dirty laundry, and use the warmest water setting you can.    If you have a weakened immune system, ask your doctor about other actions you should take.    For more tips, go to www.cdc.gov/coronavirus/2019-ncov/downloads/10Things.pdf.    You should not go back to work until you meet the guidelines above for ending your home isolation. You don't need to be retested for COVID-19 before going back to work--studies show that you won't spread the virus if it's been at least 10 days since your symptoms started (or 20 days, if you have a weak immune system).    Employers: This document serves as formal notice of your employee's medical guidelines for going back to work. They must meet the above guidelines before going back to work in person.    How can I take care of myself?    1. Get lots of rest. Drink extra fluids (unless a doctor has told you not to).    2. Take Tylenol (acetaminophen) for fever or pain. If you have liver or kidney problems, ask your family doctor if it's okay to take Tylenol.     Take either:     650 mg (two 325 mg pills) every 4 to 6 hours, or     1,000 mg (two 500 mg pills) every 8 hours as needed.     Note: Don't take more than 3,000 mg in one day. Acetaminophen is found in many medicines (both prescribed and over-the-counter medicines). Read all labels to be sure you don't take too much.    For children, check the Tylenol bottle for the right dose (based on their age or weight).    3. If you have other health problems (like cancer, heart failure, an organ transplant or severe kidney disease): Call your specialty clinic if you don't feel better in the next 2 days.    4. Know when to call 911: Emergency warning signs include:    Trouble breathing or shortness of breath    Pain or pressure in the chest that doesn't go away    Feeling confused like you haven't felt before, or not being able to wake up    Bluish-colored lips or  face    5. Sign up for [employer]. We know it's scary to hear that you have COVID-19. We want to track your symptoms to make sure you're okay over the next 2 weeks. Please look for an email from [employer]--this is a free, online program that we'll use to keep in touch. To sign up, follow the link in the email. Learn more at [email or web address].    Where can I get more information?    [facility]: [email or web address]    Coronavirus Basics: www.health.[facility].[place]./diseases/coronavirus/basics.html    What to Do If You're Sick: www.cdc.gov/coronavirus/2019-ncov/about/steps-when-sick.html    Ending Home Isolation: www.cdc.gov/coronavirus/2019-ncov/hcp/disposition-in-home-patients.html     Caring for Someone with COVID-19: www.cdc.gov/coronavirus/2019-ncov/if-you-are-sick/care-for-someone.html     [facility] clinical trials (COVID-19 research studies): [email or web address].[email or web address]-clinical-trials     A Positive COVID-19 letter will be sent via [employer] or the mail. (Exception, no letters sent to Presurgerical/Preprocedure Patients)    Joann No LPN

## 2022-03-01 RX ORDER — METHYLPREDNISOLONE 4 MG
TABLET, DOSE PACK ORAL
Qty: 21 TABLET | Refills: 3 | Status: SHIPPED | OUTPATIENT
Start: 2022-03-01 | End: 2024-07-01

## 2022-03-19 ENCOUNTER — HEALTH MAINTENANCE LETTER (OUTPATIENT)
Age: 52
End: 2022-03-19

## 2022-05-14 ENCOUNTER — HEALTH MAINTENANCE LETTER (OUTPATIENT)
Age: 52
End: 2022-05-14

## 2022-05-23 DIAGNOSIS — Z15.89 MTHFR GENE MUTATION: Primary | ICD-10-CM

## 2022-05-23 RX ORDER — VALACYCLOVIR HYDROCHLORIDE 1 G/1
1000 TABLET, FILM COATED ORAL 2 TIMES DAILY
Qty: 60 TABLET | Refills: 3 | Status: SHIPPED | OUTPATIENT
Start: 2022-05-23 | End: 2023-09-03

## 2022-09-04 ENCOUNTER — HEALTH MAINTENANCE LETTER (OUTPATIENT)
Age: 52
End: 2022-09-04

## 2023-03-03 DIAGNOSIS — Z90.710 HISTORY OF HYSTERECTOMY: Primary | ICD-10-CM

## 2023-03-03 RX ORDER — CLINDAMYCIN HCL 300 MG
300 CAPSULE ORAL 4 TIMES DAILY
Qty: 28 CAPSULE | Refills: 0 | Status: SHIPPED | OUTPATIENT
Start: 2023-03-03 | End: 2024-07-01

## 2023-03-03 RX ORDER — FLUCONAZOLE 150 MG/1
150 TABLET ORAL
Qty: 3 TABLET | Refills: 0 | Status: SHIPPED | OUTPATIENT
Start: 2023-03-03 | End: 2023-03-10

## 2023-04-29 ENCOUNTER — HEALTH MAINTENANCE LETTER (OUTPATIENT)
Age: 53
End: 2023-04-29

## 2023-06-03 ENCOUNTER — HEALTH MAINTENANCE LETTER (OUTPATIENT)
Age: 53
End: 2023-06-03

## 2023-09-03 DIAGNOSIS — Z15.89 MTHFR GENE MUTATION: ICD-10-CM

## 2023-09-03 RX ORDER — VALACYCLOVIR HYDROCHLORIDE 1 G/1
1000 TABLET, FILM COATED ORAL 2 TIMES DAILY
Qty: 60 TABLET | Refills: 3 | Status: SHIPPED | OUTPATIENT
Start: 2023-09-03 | End: 2023-09-08

## 2023-09-08 DIAGNOSIS — Z15.89 MTHFR GENE MUTATION: ICD-10-CM

## 2023-09-08 RX ORDER — VALACYCLOVIR HYDROCHLORIDE 1 G/1
1000 TABLET, FILM COATED ORAL 2 TIMES DAILY
Qty: 60 TABLET | Refills: 3 | Status: SHIPPED | OUTPATIENT
Start: 2023-09-08 | End: 2024-01-11

## 2023-11-27 ENCOUNTER — LAB REQUISITION (OUTPATIENT)
Dept: LAB | Facility: CLINIC | Age: 53
End: 2023-11-27

## 2023-11-27 PROCEDURE — 87635 SARS-COV-2 COVID-19 AMP PRB: CPT | Performed by: INTERNAL MEDICINE

## 2023-11-28 LAB — SARS-COV-2 RNA RESP QL NAA+PROBE: NEGATIVE

## 2024-01-11 DIAGNOSIS — Z15.89 MTHFR GENE MUTATION: ICD-10-CM

## 2024-01-11 RX ORDER — VALACYCLOVIR HYDROCHLORIDE 1 G/1
2000 TABLET, FILM COATED ORAL 2 TIMES DAILY
Qty: 60 TABLET | Refills: 3 | Status: SHIPPED | OUTPATIENT
Start: 2024-01-11 | End: 2024-07-01

## 2024-02-01 ENCOUNTER — TRANSFERRED RECORDS (OUTPATIENT)
Dept: MULTI SPECIALTY CLINIC | Facility: CLINIC | Age: 54
End: 2024-02-01

## 2024-02-01 LAB — PAP SMEAR - HIM PATIENT REPORTED: NORMAL

## 2024-04-05 ENCOUNTER — ANCILLARY PROCEDURE (OUTPATIENT)
Dept: MAMMOGRAPHY | Facility: CLINIC | Age: 54
End: 2024-04-05
Payer: COMMERCIAL

## 2024-04-05 DIAGNOSIS — Z12.31 VISIT FOR SCREENING MAMMOGRAM: ICD-10-CM

## 2024-04-05 PROCEDURE — 77067 SCR MAMMO BI INCL CAD: CPT

## 2024-06-25 DIAGNOSIS — B00.9 HERPES SIMPLEX VIRAL INFECTION: Primary | ICD-10-CM

## 2024-06-25 RX ORDER — VALACYCLOVIR HYDROCHLORIDE 500 MG/1
1000 TABLET, FILM COATED ORAL 3 TIMES DAILY
Qty: 540 TABLET | Refills: 3 | Status: SHIPPED | OUTPATIENT
Start: 2024-06-25

## 2024-07-01 ENCOUNTER — OFFICE VISIT (OUTPATIENT)
Dept: FAMILY MEDICINE | Facility: CLINIC | Age: 54
End: 2024-07-01
Payer: COMMERCIAL

## 2024-07-01 VITALS
HEART RATE: 62 BPM | HEIGHT: 67 IN | OXYGEN SATURATION: 98 % | DIASTOLIC BLOOD PRESSURE: 66 MMHG | BODY MASS INDEX: 26.37 KG/M2 | WEIGHT: 168 LBS | TEMPERATURE: 97.7 F | SYSTOLIC BLOOD PRESSURE: 118 MMHG

## 2024-07-01 DIAGNOSIS — Z01.818 PREOP GENERAL PHYSICAL EXAM: Primary | ICD-10-CM

## 2024-07-01 DIAGNOSIS — Z15.89 MTHFR GENE MUTATION: ICD-10-CM

## 2024-07-01 DIAGNOSIS — N81.9 FEMALE GENITAL PROLAPSE, UNSPECIFIED TYPE: ICD-10-CM

## 2024-07-01 PROCEDURE — 99204 OFFICE O/P NEW MOD 45 MIN: CPT | Performed by: PHYSICIAN ASSISTANT

## 2024-07-01 NOTE — PATIENT INSTRUCTIONS
Patient Education   Preparing for Your Surgery  Getting started  A nurse will call you to review your health history and instructions. They will give you an arrival time based on your scheduled surgery time. Please be ready to share:  Your doctor's clinic name and phone number  Your medical, surgical, and anesthesia history  A list of allergies and sensitivities  A list of medicines, including herbal treatments and over-the-counter drugs  Whether the patient has a legal guardian (ask how to send us the papers in advance)  Please tell us if you're pregnant--or if there's any chance you might be pregnant. Some surgeries may injure a fetus (unborn baby), so they require a pregnancy test. Surgeries that are safe for a fetus don't always need a test, and you can choose whether to have one.   If you have a child who's having surgery, please ask for a copy of Preparing for Your Child's Surgery.    Preparing for surgery  Within 10 to 30 days of surgery: Have a pre-op exam (sometimes called an H&P, or History and Physical). This can be done at a clinic or pre-operative center.  If you're having a , you may not need this exam. Talk to your care team.  At your pre-op exam, talk to your care team about all medicines you take. If you need to stop any medicines before surgery, ask when to start taking them again.  We do this for your safety. Many medicines can make you bleed too much during surgery. Some change how well surgery (anesthesia) drugs work.  Call your insurance company to let them know you're having surgery. (If you don't have insurance, call [phone].)  Call your clinic if there's any change in your health. This includes signs of a cold or flu (sore throat, runny nose, cough, rash, fever). It also includes a scrape or scratch near the surgery site.  If you have questions on the day of surgery, call your hospital or surgery center.  Eating and drinking guidelines  For your safety: Unless your surgeon  tells you otherwise, follow the guidelines below.  Eat and drink as usual until 8 hours before you arrive for surgery. After that, no food or milk.  Drink clear liquids until 2 hours before you arrive. These are liquids you can see through, like water, Gatorade, and Propel Water. They also include plain black coffee and tea (no cream or milk), candy, and breath mints. You can spit out gum when you arrive.  If you drink alcohol: Stop drinking it the night before surgery.  If your care team tells you to take medicine on the morning of surgery, it's okay to take it with a sip of water.  Preventing infection  Shower or bathe the night before and morning of your surgery. Follow the instructions your clinic gave you. (If no instructions, use regular soap.)  Don't shave or clip hair near your surgery site. We'll remove the hair if needed.  Don't smoke or vape the morning of surgery. You may chew nicotine gum up to 2 hours before surgery. A nicotine patch is okay.  Note: Some surgeries require you to completely quit smoking and nicotine. Check with your surgeon.  Your care team will make every effort to keep you safe from infection. We will:  Clean our hands often with soap and water (or an alcohol-based hand rub).  Clean the skin at your surgery site with a special soap that kills germs.  Give you a special gown to keep you warm. (Cold raises the risk of infection.)  Wear special hair covers, masks, gowns and gloves during surgery.  Give antibiotic medicine, if prescribed. Not all surgeries need antibiotics.  What to bring on the day of surgery  Photo ID and insurance card  Copy of your health care directive, if you have one  Glasses and hearing aids (bring cases)  You can't wear contacts during surgery  Inhaler and eye drops, if you use them (tell us about these when you arrive)  CPAP machine or breathing device, if you use them  A few personal items, if spending the night  If you have . . .  A pacemaker, ICD (cardiac  defibrillator) or other implant: Bring the ID card.  An implanted stimulator: Bring the remote control.  A legal guardian: Bring a copy of the certified (court-stamped) guardianship papers.  Please remove any jewelry, including body piercings. Leave jewelry and other valuables at home.  If you're going home the day of surgery  You must have a responsible adult drive you home. They should stay with you overnight as well.  If you don't have someone to stay with you, and you aren't safe to go home alone, we may keep you overnight. Insurance often won't pay for this.  After surgery  If it's hard to control your pain or you need more pain medicine, please call your surgeon's office.  Questions?   If you have any questions for your care team, list them here: _________________________________________________________________________________________________________________________________________________________________________ ____________________________________ ____________________________________ ____________________________________  For informational purposes only. Not to replace the advice of your health care provider. Copyright   2003, 2019 [facility]. All rights reserved. Clinically reviewed by Cynthia Gibbs MD. [employer] 023907 - REV 12/22.

## 2024-07-01 NOTE — PROGRESS NOTES
Preoperative Evaluation  [facility]  [address] [facility] [address]  Phone: [phone]  Primary Provider: Bailey Cerda MD  Pre-op Performing Provider: Aniya Kwon PA-C  Jul 1, 2024 6/30/2024   Surgical Information   What procedure is being done? ROBOTIC SACROCOLPOPEXY PARAVAGINAL REPAIR POSTERIOR REPAIR    Facility or Hospital where procedure/surgery will be performed: [facility]   Who is doing the procedure / surgery? Dr. Talley   Date of surgery / procedure: 7/3/2024   Time of surgery / procedure: 7:30am   Where do you plan to recover after surgery? at home with family        Fax number for surgical facility: Note does not need to be faxed, will be available electronically in Epic.    Assessment & Plan     The proposed surgical procedure is considered INTERMEDIATE risk.      ICD-10-CM    1. Preop general physical exam  Z01.818       2. Female genital prolapse, unspecified type  N81.9       3. MTHFR gene mutation  Z15.89                     - No identified additional risk factors other than previously addressed    Antiplatelet or Anticoagulation Medication Instructions   - Patient is on no antiplatelet or anticoagulation medications.    Recommendation  Approval given to proceed with proposed procedure, without further diagnostic evaluation.    Deanna Urban is a 53 year old, presenting for the following:  Pre-Op Exam          7/1/2024    10:37 AM   Additional Questions   Roomed by DOMONIQUE Singh     HPI related to upcoming procedure:   Vaginal bulge with urinary urgency and bowel pressure        6/30/2024   Pre-Op Questionnaire   Have you ever had a heart attack or stroke? No   Have you ever had surgery on your heart or blood vessels, such as a stent placement, a coronary artery bypass, or surgery on an artery in your head, neck, heart, or legs? No   Do you have chest pain with activity? No   Do you have a history of heart failure? No    Do you currently have a cold, bronchitis or symptoms of other infection? No   Do you have a cough, shortness of breath, or wheezing? No   Do you or anyone in your family have previous history of blood clots? (!) YES - she had a clot after her hysterectomy in 2012/2013. Found to have MTHFR gene mutation. All other clotting tests negative. Was on warfarin and coumadin for 6 months. No clots since   Do you or does anyone in your family have a serious bleeding problem such as prolonged bleeding following surgeries or cuts? No   Have you ever had problems with anemia or been told to take iron pills? No   Have you had any abnormal blood loss such as black, tarry or bloody stools, or abnormal vaginal bleeding? No   Have you ever had a blood transfusion? (!) YES - due to hemorrhaging from ruptured ovarian cyst   Have you ever had a transfusion reaction? No   Are you willing to have a blood transfusion if it is medically needed before, during, or after your surgery? Yes   Have you or any of your relatives ever had problems with anesthesia? No   Do you have sleep apnea, excessive snoring or daytime drowsiness? No   Do you have any artifical heart valves or other implanted medical devices like a pacemaker, defibrillator, or continuous glucose monitor? No   Do you have artificial joints? No   Are you allergic to latex? No        Health Care Directive  Patient does not have a Health Care Directive or Living Will:     Preoperative Review of    reviewed - no record of controlled substances prescribed.      Status of Chronic Conditions:  See problem list for active medical problems.  Problems all longstanding and stable, except as noted/documented.  See ROS for pertinent symptoms related to these conditions.    Patient Active Problem List    Diagnosis Date Noted    History of hysterectomy 07/01/2019     Priority: Medium    History of deep vein thrombosis (DVT) of lower extremity 07/01/2019     Priority: Medium    MTHFR gene  mutation 2019     Priority: Medium    H/O laminectomy 2019     Priority: Medium      Past Medical History:   Diagnosis Date    DVT (deep venous thrombosis) (H)     Low back pain     MTHFR mutation (H)     PONV (postoperative nausea and vomiting)      Past Surgical History:   Procedure Laterality Date    BIOPSY BREAST Left 2006    LIPOMA     SECTION      LUMBAR DISCECTOMY Left 9/3/2015    Procedure: LEFT L4 L5 HEMILAMINECTOMY MICRODISCECTOMY;  Surgeon: Dinora Gonzalez MD;  Location: [facility];  Service:     [facility] TOTAL ABDOM HYSTERECTOMY      Description: Hysterectomy;  Recorded: 2014;     Current Outpatient Medications   Medication Sig Dispense Refill    methylPREDNISolone (MEDROL DOSEPAK) 4 MG tablet therapy pack Follow Package Directions 21 tablet 3    valACYclovir (VALTREX) 500 MG tablet Take 2 tablets (1,000 mg) by mouth 3 times daily 540 tablet 3    clindamycin (CLEOCIN) 300 MG capsule Take 1 capsule (300 mg) by mouth 4 times daily 28 capsule 0    clobetasol (TEMOVATE) 0.05 % external ointment Apply topically 2 times daily      clobetasol (TEMOVATE) 0.05 % external ointment Apply twice daily as needed for rash on trunk and extremities. Do not apply to groin or face. 90 g 2    clobetasol propionate (CLOBEX) 0.05 % external shampoo Apply to a dry scalp, leave on for 15 minutes, then lather and rinse, do 2 to 3 times per week (Patient not taking: Reported on 2024) 60 mL 2    ketoconazole (NIZORAL) 2 % external shampoo       mometasone (ELOCON) 0.1 % external ointment Apply topically twice a week 45 g 3    tacrolimus (PROTOPIC) 0.1 % external ointment Apply topically 2 times daily To active lesions Monday through Friday. Can use other steroid ointment Saturday and  60 g 3    valACYclovir (VALTREX) 1000 mg tablet Take 2 tablets (2,000 mg) by mouth 2 times daily 60 tablet 3       No Known Allergies     Social History     Tobacco Use    Smoking status: Never     "Smokeless tobacco: Never   Substance Use Topics    Alcohol use: Not on file     Family History   Problem Relation Age of Onset    Melanoma No family hx of     Skin Cancer No family hx of     No Known Problems Mother     No Known Problems Father     No Known Problems Sister     No Known Problems Brother     Breast Cancer Maternal Aunt     No Known Problems Maternal Uncle     No Known Problems Paternal Aunt     No Known Problems Paternal Uncle     No Known Problems Maternal Grandmother     No Known Problems Maternal Grandfather     No Known Problems Paternal Grandmother     No Known Problems Paternal Grandfather     Breast Cancer Maternal Aunt      History   Drug Use Not on file             Review of Systems  CONSTITUTIONAL: NEGATIVE for fever, chills, change in weight  INTEGUMENTARY/SKIN: NEGATIVE for worrisome rashes, moles or lesions  EYES: NEGATIVE for vision changes or irritation  ENT/MOUTH: NEGATIVE for ear, mouth and throat problems  RESP: NEGATIVE for significant cough or SOB  BREAST: NEGATIVE for masses, tenderness or discharge  CV: NEGATIVE for chest pain, palpitations or peripheral edema  GI: NEGATIVE for nausea, abdominal pain, heartburn, or change in bowel habits  : NEGATIVE for frequency, dysuria, or hematuria  MUSCULOSKELETAL: NEGATIVE for significant arthralgias or myalgia  NEURO: NEGATIVE for weakness, dizziness or paresthesias  ENDOCRINE: NEGATIVE for temperature intolerance, skin/hair changes  HEME: NEGATIVE for bleeding problems  PSYCHIATRIC: NEGATIVE for changes in mood or affect    Objective    /66   Pulse 62   Temp 97.7  F (36.5  C) (Tympanic)   Ht 1.702 m (5' 7\")   Wt 76.2 kg (168 lb)   SpO2 98%   BMI 26.31 kg/m     Estimated body mass index is 26.31 kg/m  as calculated from the following:    Height as of this encounter: 1.702 m (5' 7\").    Weight as of this encounter: 76.2 kg (168 lb).  Physical Exam  GENERAL: alert and no distress  EYES: Eyes grossly normal to inspection, PERRL " and conjunctivae and sclerae normal  HENT: ear canals and TM's normal, nose and mouth without ulcers or lesions  NECK: no adenopathy, no asymmetry, masses, or scars  RESP: lungs clear to auscultation - no rales, rhonchi or wheezes  CV: regular rate and rhythm, normal S1 S2, no S3 or S4, no murmur, click or rub, no peripheral edema  MS: no gross musculoskeletal defects noted, no edema  SKIN: no suspicious lesions or rashes  NEURO: Normal strength and tone, mentation intact and speech normal  PSYCH: mentation appears normal, affect normal/bright      Diagnostics  No labs were ordered during this visit. HGB to be drawn morning of surgery  No EKG required, no history of coronary heart disease, significant arrhythmia, peripheral arterial disease or other structural heart disease.    Revised Cardiac Risk Index (RCRI)  The patient has the following serious cardiovascular risks for perioperative complications:   - No serious cardiac risks = 0 points     RCRI Interpretation: 0 points: Class I (very low risk - 0.4% complication rate)         Signed Electronically by: Aniya Kwon PA-C  Copy of this evaluation report is provided to requesting physician.

## 2024-07-02 ENCOUNTER — ANESTHESIA EVENT (OUTPATIENT)
Dept: SURGERY | Facility: HOSPITAL | Age: 54
End: 2024-07-02
Payer: COMMERCIAL

## 2024-07-02 RX ORDER — CLINDAMYCIN HCL 300 MG
300 CAPSULE ORAL 4 TIMES DAILY
Status: ON HOLD | COMMUNITY
End: 2024-07-03

## 2024-07-02 RX ORDER — TACROLIMUS 1 MG/G
OINTMENT TOPICAL 2 TIMES DAILY
Status: ON HOLD | COMMUNITY
End: 2024-07-03

## 2024-07-02 RX ORDER — CLOBETASOL PROPIONATE 0.5 MG/G
OINTMENT TOPICAL 2 TIMES DAILY PRN
COMMUNITY

## 2024-07-02 RX ORDER — METHYLPREDNISOLONE 4 MG
TABLET, DOSE PACK ORAL SEE ADMIN INSTRUCTIONS
Status: ON HOLD | COMMUNITY
End: 2024-07-03

## 2024-07-02 RX ORDER — CLOBETASOL PROPIONATE 0.05 G/100ML
SHAMPOO TOPICAL
Status: ON HOLD | COMMUNITY
End: 2024-07-03

## 2024-07-02 RX ORDER — KETOCONAZOLE 20 MG/ML
SHAMPOO TOPICAL DAILY PRN
Status: ON HOLD | COMMUNITY
End: 2024-07-03

## 2024-07-02 RX ORDER — MOMETASONE FUROATE 1 MG/G
OINTMENT TOPICAL DAILY
Status: ON HOLD | COMMUNITY
End: 2024-07-03

## 2024-07-03 ENCOUNTER — HOSPITAL ENCOUNTER (OUTPATIENT)
Facility: HOSPITAL | Age: 54
Discharge: HOME | End: 2024-07-03
Attending: OBSTETRICS & GYNECOLOGY | Admitting: OBSTETRICS & GYNECOLOGY
Payer: COMMERCIAL

## 2024-07-03 ENCOUNTER — ANESTHESIA (OUTPATIENT)
Dept: SURGERY | Facility: HOSPITAL | Age: 54
End: 2024-07-03
Payer: COMMERCIAL

## 2024-07-03 VITALS
OXYGEN SATURATION: 95 % | BODY MASS INDEX: 26.16 KG/M2 | RESPIRATION RATE: 20 BRPM | HEART RATE: 85 BPM | TEMPERATURE: 96.2 F | DIASTOLIC BLOOD PRESSURE: 51 MMHG | SYSTOLIC BLOOD PRESSURE: 99 MMHG | WEIGHT: 167 LBS

## 2024-07-03 DIAGNOSIS — N81.89 OTHER FEMALE GENITAL PROLAPSE: Primary | ICD-10-CM

## 2024-07-03 LAB
HCG UR QL: NEGATIVE
HGB BLD-MCNC: 14.6 G/DL (ref 11.7–15.7)

## 2024-07-03 PROCEDURE — 370N000017 HC ANESTHESIA TECHNICAL FEE, PER MIN: Performed by: OBSTETRICS & GYNECOLOGY

## 2024-07-03 PROCEDURE — 57425 LAPAROSCOPY SURG COLPOPEXY: CPT | Performed by: STUDENT IN AN ORGANIZED HEALTH CARE EDUCATION/TRAINING PROGRAM

## 2024-07-03 PROCEDURE — 57425 LAPAROSCOPY SURG COLPOPEXY: CPT | Performed by: NURSE ANESTHETIST, CERTIFIED REGISTERED

## 2024-07-03 PROCEDURE — 250N000009 HC RX 250: Performed by: STUDENT IN AN ORGANIZED HEALTH CARE EDUCATION/TRAINING PROGRAM

## 2024-07-03 PROCEDURE — 250N000013 HC RX MED GY IP 250 OP 250 PS 637: Performed by: STUDENT IN AN ORGANIZED HEALTH CARE EDUCATION/TRAINING PROGRAM

## 2024-07-03 PROCEDURE — 272N000001 HC OR GENERAL SUPPLY STERILE: Performed by: OBSTETRICS & GYNECOLOGY

## 2024-07-03 PROCEDURE — 36415 COLL VENOUS BLD VENIPUNCTURE: CPT | Performed by: PHYSICIAN ASSISTANT

## 2024-07-03 PROCEDURE — 258N000003 HC RX IP 258 OP 636: Performed by: OBSTETRICS & GYNECOLOGY

## 2024-07-03 PROCEDURE — 272N000004 HC RX 272: Performed by: OBSTETRICS & GYNECOLOGY

## 2024-07-03 PROCEDURE — 81025 URINE PREGNANCY TEST: CPT | Performed by: PHYSICIAN ASSISTANT

## 2024-07-03 PROCEDURE — 250N000011 HC RX IP 250 OP 636: Performed by: STUDENT IN AN ORGANIZED HEALTH CARE EDUCATION/TRAINING PROGRAM

## 2024-07-03 PROCEDURE — 710N000010 HC RECOVERY PHASE 1, LEVEL 2, PER MIN: Performed by: OBSTETRICS & GYNECOLOGY

## 2024-07-03 PROCEDURE — 250N000011 HC RX IP 250 OP 636: Performed by: NURSE ANESTHETIST, CERTIFIED REGISTERED

## 2024-07-03 PROCEDURE — 250N000025 HC SEVOFLURANE, PER MIN: Performed by: OBSTETRICS & GYNECOLOGY

## 2024-07-03 PROCEDURE — 250N000009 HC RX 250: Performed by: OBSTETRICS & GYNECOLOGY

## 2024-07-03 PROCEDURE — C1781 MESH (IMPLANTABLE): HCPCS | Performed by: OBSTETRICS & GYNECOLOGY

## 2024-07-03 PROCEDURE — 250N000013 HC RX MED GY IP 250 OP 250 PS 637: Performed by: PHYSICIAN ASSISTANT

## 2024-07-03 PROCEDURE — 360N000080 HC SURGERY LEVEL 7, PER MIN: Performed by: OBSTETRICS & GYNECOLOGY

## 2024-07-03 PROCEDURE — 85018 HEMOGLOBIN: CPT | Performed by: PHYSICIAN ASSISTANT

## 2024-07-03 PROCEDURE — 258N000003 HC RX IP 258 OP 636: Performed by: NURSE ANESTHETIST, CERTIFIED REGISTERED

## 2024-07-03 PROCEDURE — 250N000011 HC RX IP 250 OP 636: Performed by: ANESTHESIOLOGY

## 2024-07-03 PROCEDURE — 710N000012 HC RECOVERY PHASE 2, PER MINUTE: Performed by: OBSTETRICS & GYNECOLOGY

## 2024-07-03 PROCEDURE — 250N000011 HC RX IP 250 OP 636: Performed by: OBSTETRICS & GYNECOLOGY

## 2024-07-03 PROCEDURE — 258N000003 HC RX IP 258 OP 636: Performed by: ANESTHESIOLOGY

## 2024-07-03 PROCEDURE — 250N000011 HC RX IP 250 OP 636: Performed by: PHYSICIAN ASSISTANT

## 2024-07-03 PROCEDURE — 999N000141 HC STATISTIC PRE-PROCEDURE NURSING ASSESSMENT: Performed by: OBSTETRICS & GYNECOLOGY

## 2024-07-03 PROCEDURE — 258N000003 HC RX IP 258 OP 636: Performed by: STUDENT IN AN ORGANIZED HEALTH CARE EDUCATION/TRAINING PROGRAM

## 2024-07-03 DEVICE — POLYPROPYLENE MESH FOR SACROCOLPOSUSPENSION/SACROCOLPOPEXY - Y CONTOUR™
Type: IMPLANTABLE DEVICE | Site: PELVIS | Status: FUNCTIONAL
Brand: RESTORELLE

## 2024-07-03 RX ORDER — METHOCARBAMOL 500 MG/1
500 TABLET, FILM COATED ORAL ONCE
Status: COMPLETED | OUTPATIENT
Start: 2024-07-03 | End: 2024-07-03

## 2024-07-03 RX ORDER — ONDANSETRON 4 MG/1
4 TABLET, ORALLY DISINTEGRATING ORAL EVERY 8 HOURS PRN
Qty: 4 TABLET | Refills: 0 | Status: SHIPPED | OUTPATIENT
Start: 2024-07-03

## 2024-07-03 RX ORDER — ONDANSETRON 2 MG/ML
4 INJECTION INTRAMUSCULAR; INTRAVENOUS EVERY 30 MIN PRN
Status: DISCONTINUED | OUTPATIENT
Start: 2024-07-03 | End: 2024-07-03 | Stop reason: HOSPADM

## 2024-07-03 RX ORDER — LIDOCAINE HYDROCHLORIDE 10 MG/ML
INJECTION, SOLUTION INFILTRATION; PERINEURAL PRN
Status: DISCONTINUED | OUTPATIENT
Start: 2024-07-03 | End: 2024-07-03

## 2024-07-03 RX ORDER — HYDROMORPHONE HYDROCHLORIDE 2 MG/1
2 TABLET ORAL ONCE
Status: COMPLETED | OUTPATIENT
Start: 2024-07-03 | End: 2024-07-03

## 2024-07-03 RX ORDER — SODIUM CHLORIDE, SODIUM LACTATE, POTASSIUM CHLORIDE, CALCIUM CHLORIDE 600; 310; 30; 20 MG/100ML; MG/100ML; MG/100ML; MG/100ML
INJECTION, SOLUTION INTRAVENOUS CONTINUOUS
Status: DISCONTINUED | OUTPATIENT
Start: 2024-07-03 | End: 2024-07-03 | Stop reason: HOSPADM

## 2024-07-03 RX ORDER — IBUPROFEN 200 MG
800 TABLET ORAL ONCE
Status: DISCONTINUED | OUTPATIENT
Start: 2024-07-03 | End: 2024-07-03

## 2024-07-03 RX ORDER — DEXAMETHASONE SODIUM PHOSPHATE 4 MG/ML
4 INJECTION, SOLUTION INTRA-ARTICULAR; INTRALESIONAL; INTRAMUSCULAR; INTRAVENOUS; SOFT TISSUE
Status: DISCONTINUED | OUTPATIENT
Start: 2024-07-03 | End: 2024-07-03 | Stop reason: HOSPADM

## 2024-07-03 RX ORDER — HYDROMORPHONE HCL IN WATER/PF 6 MG/30 ML
0.4 PATIENT CONTROLLED ANALGESIA SYRINGE INTRAVENOUS EVERY 5 MIN PRN
Status: DISCONTINUED | OUTPATIENT
Start: 2024-07-03 | End: 2024-07-03 | Stop reason: HOSPADM

## 2024-07-03 RX ORDER — MAGNESIUM SULFATE 4 G/50ML
4 INJECTION INTRAVENOUS ONCE
Status: COMPLETED | OUTPATIENT
Start: 2024-07-03 | End: 2024-07-03

## 2024-07-03 RX ORDER — ACETAMINOPHEN 325 MG/1
975 TABLET ORAL ONCE
Status: COMPLETED | OUTPATIENT
Start: 2024-07-03 | End: 2024-07-03

## 2024-07-03 RX ORDER — BUPIVACAINE HYDROCHLORIDE 2.5 MG/ML
INJECTION, SOLUTION INFILTRATION; PERINEURAL PRN
Status: DISCONTINUED | OUTPATIENT
Start: 2024-07-03 | End: 2024-07-03 | Stop reason: HOSPADM

## 2024-07-03 RX ORDER — FENTANYL CITRATE 50 UG/ML
50 INJECTION, SOLUTION INTRAMUSCULAR; INTRAVENOUS EVERY 5 MIN PRN
Status: DISCONTINUED | OUTPATIENT
Start: 2024-07-03 | End: 2024-07-03 | Stop reason: HOSPADM

## 2024-07-03 RX ORDER — DEXAMETHASONE SODIUM PHOSPHATE 10 MG/ML
4 INJECTION, SOLUTION INTRAMUSCULAR; INTRAVENOUS
Status: DISCONTINUED | OUTPATIENT
Start: 2024-07-03 | End: 2024-07-03 | Stop reason: HOSPADM

## 2024-07-03 RX ORDER — ACETAMINOPHEN 325 MG/1
975 TABLET ORAL ONCE
Status: DISCONTINUED | OUTPATIENT
Start: 2024-07-03 | End: 2024-07-03

## 2024-07-03 RX ORDER — ONDANSETRON 2 MG/ML
INJECTION INTRAMUSCULAR; INTRAVENOUS PRN
Status: DISCONTINUED | OUTPATIENT
Start: 2024-07-03 | End: 2024-07-03

## 2024-07-03 RX ORDER — OXYCODONE HYDROCHLORIDE 5 MG/1
10 TABLET ORAL
Status: COMPLETED | OUTPATIENT
Start: 2024-07-03 | End: 2024-07-03

## 2024-07-03 RX ORDER — NALOXONE HYDROCHLORIDE 0.4 MG/ML
0.1 INJECTION, SOLUTION INTRAMUSCULAR; INTRAVENOUS; SUBCUTANEOUS
Status: DISCONTINUED | OUTPATIENT
Start: 2024-07-03 | End: 2024-07-03 | Stop reason: HOSPADM

## 2024-07-03 RX ORDER — PROPOFOL 10 MG/ML
INJECTION, EMULSION INTRAVENOUS CONTINUOUS PRN
Status: DISCONTINUED | OUTPATIENT
Start: 2024-07-03 | End: 2024-07-03

## 2024-07-03 RX ORDER — PROPOFOL 10 MG/ML
INJECTION, EMULSION INTRAVENOUS PRN
Status: DISCONTINUED | OUTPATIENT
Start: 2024-07-03 | End: 2024-07-03

## 2024-07-03 RX ORDER — ONDANSETRON 4 MG/1
4 TABLET, ORALLY DISINTEGRATING ORAL EVERY 30 MIN PRN
Status: DISCONTINUED | OUTPATIENT
Start: 2024-07-03 | End: 2024-07-03 | Stop reason: HOSPADM

## 2024-07-03 RX ORDER — HYDROMORPHONE HCL IN WATER/PF 6 MG/30 ML
0.2 PATIENT CONTROLLED ANALGESIA SYRINGE INTRAVENOUS EVERY 5 MIN PRN
Status: DISCONTINUED | OUTPATIENT
Start: 2024-07-03 | End: 2024-07-03 | Stop reason: HOSPADM

## 2024-07-03 RX ORDER — DEXAMETHASONE SODIUM PHOSPHATE 10 MG/ML
INJECTION, SOLUTION INTRAMUSCULAR; INTRAVENOUS PRN
Status: DISCONTINUED | OUTPATIENT
Start: 2024-07-03 | End: 2024-07-03

## 2024-07-03 RX ORDER — LIDOCAINE HYDROCHLORIDE AND EPINEPHRINE 10; 10 MG/ML; UG/ML
INJECTION, SOLUTION INFILTRATION; PERINEURAL PRN
Status: DISCONTINUED | OUTPATIENT
Start: 2024-07-03 | End: 2024-07-03 | Stop reason: HOSPADM

## 2024-07-03 RX ORDER — KETAMINE HYDROCHLORIDE 10 MG/ML
INJECTION INTRAMUSCULAR; INTRAVENOUS PRN
Status: DISCONTINUED | OUTPATIENT
Start: 2024-07-03 | End: 2024-07-03

## 2024-07-03 RX ORDER — ACETAMINOPHEN 325 MG/1
975 TABLET ORAL ONCE
Status: DISCONTINUED | OUTPATIENT
Start: 2024-07-03 | End: 2024-07-03 | Stop reason: HOSPADM

## 2024-07-03 RX ORDER — SODIUM CHLORIDE, SODIUM LACTATE, POTASSIUM CHLORIDE, CALCIUM CHLORIDE 600; 310; 30; 20 MG/100ML; MG/100ML; MG/100ML; MG/100ML
INJECTION, SOLUTION INTRAVENOUS CONTINUOUS PRN
Status: DISCONTINUED | OUTPATIENT
Start: 2024-07-03 | End: 2024-07-03

## 2024-07-03 RX ORDER — KETOROLAC TROMETHAMINE 15 MG/ML
15 INJECTION, SOLUTION INTRAMUSCULAR; INTRAVENOUS ONCE
Status: COMPLETED | OUTPATIENT
Start: 2024-07-03 | End: 2024-07-03

## 2024-07-03 RX ORDER — CEFAZOLIN SODIUM/WATER 2 G/20 ML
2 SYRINGE (ML) INTRAVENOUS
Status: COMPLETED | OUTPATIENT
Start: 2024-07-03 | End: 2024-07-03

## 2024-07-03 RX ORDER — CEFAZOLIN SODIUM/WATER 2 G/20 ML
2 SYRINGE (ML) INTRAVENOUS SEE ADMIN INSTRUCTIONS
Status: DISCONTINUED | OUTPATIENT
Start: 2024-07-03 | End: 2024-07-03 | Stop reason: HOSPADM

## 2024-07-03 RX ORDER — HALOPERIDOL 5 MG/ML
2 INJECTION INTRAMUSCULAR ONCE
Status: DISCONTINUED | OUTPATIENT
Start: 2024-07-03 | End: 2024-07-03 | Stop reason: HOSPADM

## 2024-07-03 RX ORDER — IBUPROFEN 200 MG
800 TABLET ORAL ONCE
Status: DISCONTINUED | OUTPATIENT
Start: 2024-07-03 | End: 2024-07-03 | Stop reason: HOSPADM

## 2024-07-03 RX ORDER — HALOPERIDOL 2 MG/ML
2 SOLUTION ORAL ONCE
Status: DISCONTINUED | OUTPATIENT
Start: 2024-07-03 | End: 2024-07-03

## 2024-07-03 RX ORDER — OXYCODONE HYDROCHLORIDE 5 MG/1
5-10 TABLET ORAL EVERY 4 HOURS PRN
Qty: 16 TABLET | Refills: 0 | Status: SHIPPED | OUTPATIENT
Start: 2024-07-03

## 2024-07-03 RX ORDER — SODIUM CHLORIDE, SODIUM LACTATE, POTASSIUM CHLORIDE, AND CALCIUM CHLORIDE .6; .31; .03; .02 G/100ML; G/100ML; G/100ML; G/100ML
IRRIGANT IRRIGATION PRN
Status: DISCONTINUED | OUTPATIENT
Start: 2024-07-03 | End: 2024-07-03 | Stop reason: HOSPADM

## 2024-07-03 RX ORDER — LIDOCAINE 40 MG/G
CREAM TOPICAL
Status: DISCONTINUED | OUTPATIENT
Start: 2024-07-03 | End: 2024-07-03 | Stop reason: HOSPADM

## 2024-07-03 RX ORDER — SCOLOPAMINE TRANSDERMAL SYSTEM 1 MG/1
1 PATCH, EXTENDED RELEASE TRANSDERMAL
Status: DISCONTINUED | OUTPATIENT
Start: 2024-07-03 | End: 2024-07-03 | Stop reason: HOSPADM

## 2024-07-03 RX ORDER — OXYCODONE HYDROCHLORIDE 5 MG/1
5 TABLET ORAL
Status: DISCONTINUED | OUTPATIENT
Start: 2024-07-03 | End: 2024-07-03 | Stop reason: HOSPADM

## 2024-07-03 RX ORDER — HYDROMORPHONE HYDROCHLORIDE 2 MG/1
2 TABLET ORAL ONCE
Status: DISCONTINUED | OUTPATIENT
Start: 2024-07-03 | End: 2024-07-03 | Stop reason: HOSPADM

## 2024-07-03 RX ORDER — FENTANYL CITRATE 50 UG/ML
INJECTION, SOLUTION INTRAMUSCULAR; INTRAVENOUS PRN
Status: DISCONTINUED | OUTPATIENT
Start: 2024-07-03 | End: 2024-07-03

## 2024-07-03 RX ORDER — FENTANYL CITRATE 50 UG/ML
25 INJECTION, SOLUTION INTRAMUSCULAR; INTRAVENOUS EVERY 5 MIN PRN
Status: DISCONTINUED | OUTPATIENT
Start: 2024-07-03 | End: 2024-07-03 | Stop reason: HOSPADM

## 2024-07-03 RX ADMIN — HYDROMORPHONE HYDROCHLORIDE 0.5 MG: 1 INJECTION, SOLUTION INTRAMUSCULAR; INTRAVENOUS; SUBCUTANEOUS at 09:00

## 2024-07-03 RX ADMIN — PROCHLORPERAZINE EDISYLATE 5 MG: 5 INJECTION INTRAMUSCULAR; INTRAVENOUS at 15:07

## 2024-07-03 RX ADMIN — SODIUM CHLORIDE, POTASSIUM CHLORIDE, SODIUM LACTATE AND CALCIUM CHLORIDE: 600; 310; 30; 20 INJECTION, SOLUTION INTRAVENOUS at 06:56

## 2024-07-03 RX ADMIN — SODIUM CHLORIDE, POTASSIUM CHLORIDE, SODIUM LACTATE AND CALCIUM CHLORIDE: 600; 310; 30; 20 INJECTION, SOLUTION INTRAVENOUS at 07:19

## 2024-07-03 RX ADMIN — FENTANYL CITRATE 50 MCG: 50 INJECTION, SOLUTION INTRAMUSCULAR; INTRAVENOUS at 11:12

## 2024-07-03 RX ADMIN — MIDAZOLAM 2 MG: 1 INJECTION INTRAMUSCULAR; INTRAVENOUS at 07:42

## 2024-07-03 RX ADMIN — PROPOFOL 70 MG: 10 INJECTION, EMULSION INTRAVENOUS at 07:52

## 2024-07-03 RX ADMIN — ROCURONIUM BROMIDE 20 MG: 50 INJECTION, SOLUTION INTRAVENOUS at 09:20

## 2024-07-03 RX ADMIN — PROPOFOL 180 MG: 10 INJECTION, EMULSION INTRAVENOUS at 07:47

## 2024-07-03 RX ADMIN — HYDROMORPHONE HYDROCHLORIDE 0.5 MG: 1 INJECTION, SOLUTION INTRAMUSCULAR; INTRAVENOUS; SUBCUTANEOUS at 08:22

## 2024-07-03 RX ADMIN — ONDANSETRON 4 MG: 2 INJECTION INTRAMUSCULAR; INTRAVENOUS at 11:23

## 2024-07-03 RX ADMIN — HYDROMORPHONE HYDROCHLORIDE 2 MG: 2 TABLET ORAL at 14:47

## 2024-07-03 RX ADMIN — DEXAMETHASONE SODIUM PHOSPHATE 5 MG: 10 INJECTION, SOLUTION INTRAMUSCULAR; INTRAVENOUS at 07:47

## 2024-07-03 RX ADMIN — SODIUM CHLORIDE, POTASSIUM CHLORIDE, SODIUM LACTATE AND CALCIUM CHLORIDE: 600; 310; 30; 20 INJECTION, SOLUTION INTRAVENOUS at 09:46

## 2024-07-03 RX ADMIN — METHOCARBAMOL 500 MG: 500 TABLET ORAL at 13:23

## 2024-07-03 RX ADMIN — Medication 2 G: at 07:54

## 2024-07-03 RX ADMIN — LIDOCAINE HYDROCHLORIDE 50 MG: 10 INJECTION, SOLUTION INFILTRATION; PERINEURAL at 07:47

## 2024-07-03 RX ADMIN — PROPOFOL 50 MG: 10 INJECTION, EMULSION INTRAVENOUS at 10:24

## 2024-07-03 RX ADMIN — DEXMEDETOMIDINE HYDROCHLORIDE 12 MCG: 100 INJECTION, SOLUTION INTRAVENOUS at 10:00

## 2024-07-03 RX ADMIN — SCOPOLAMINE 1 PATCH: 1.5 PATCH, EXTENDED RELEASE TRANSDERMAL at 07:21

## 2024-07-03 RX ADMIN — ROCURONIUM BROMIDE 20 MG: 50 INJECTION, SOLUTION INTRAVENOUS at 10:02

## 2024-07-03 RX ADMIN — ROCURONIUM BROMIDE 50 MG: 50 INJECTION, SOLUTION INTRAVENOUS at 07:47

## 2024-07-03 RX ADMIN — DEXMEDETOMIDINE HYDROCHLORIDE 8 MCG: 100 INJECTION, SOLUTION INTRAVENOUS at 09:09

## 2024-07-03 RX ADMIN — OXYCODONE HYDROCHLORIDE 10 MG: 5 TABLET ORAL at 13:03

## 2024-07-03 RX ADMIN — FENTANYL CITRATE 100 MCG: 50 INJECTION INTRAMUSCULAR; INTRAVENOUS at 07:47

## 2024-07-03 RX ADMIN — ROCURONIUM BROMIDE 20 MG: 50 INJECTION, SOLUTION INTRAVENOUS at 08:37

## 2024-07-03 RX ADMIN — FENTANYL CITRATE 50 MCG: 50 INJECTION, SOLUTION INTRAMUSCULAR; INTRAVENOUS at 11:30

## 2024-07-03 RX ADMIN — MAGNESIUM SULFATE HEPTAHYDRATE 4 G: 80 INJECTION, SOLUTION INTRAVENOUS at 06:56

## 2024-07-03 RX ADMIN — KETAMINE HYDROCHLORIDE 30 MG: 10 INJECTION INTRAMUSCULAR; INTRAVENOUS at 10:23

## 2024-07-03 RX ADMIN — FENTANYL CITRATE 50 MCG: 50 INJECTION, SOLUTION INTRAMUSCULAR; INTRAVENOUS at 11:42

## 2024-07-03 RX ADMIN — ONDANSETRON 4 MG: 2 INJECTION INTRAMUSCULAR; INTRAVENOUS at 10:21

## 2024-07-03 RX ADMIN — ROCURONIUM BROMIDE 50 MG: 50 INJECTION, SOLUTION INTRAVENOUS at 08:08

## 2024-07-03 RX ADMIN — PROPOFOL 200 MCG/KG/MIN: 10 INJECTION, EMULSION INTRAVENOUS at 07:52

## 2024-07-03 RX ADMIN — ACETAMINOPHEN 975 MG: 325 TABLET ORAL at 06:43

## 2024-07-03 RX ADMIN — KETOROLAC TROMETHAMINE 15 MG: 15 INJECTION, SOLUTION INTRAMUSCULAR; INTRAVENOUS at 13:05

## 2024-07-03 RX ADMIN — FENTANYL CITRATE 50 MCG: 50 INJECTION, SOLUTION INTRAMUSCULAR; INTRAVENOUS at 11:17

## 2024-07-03 ASSESSMENT — ACTIVITIES OF DAILY LIVING (ADL)
ADLS_ACUITY_SCORE: 29

## 2024-07-03 NOTE — PROGRESS NOTES
Updated Dr. Lynch of patient condition and outcome from pain medication given and also updated Dr. Talley of medication given to control her pain medication.Dr. Talley will be checking the patient.

## 2024-07-03 NOTE — H&P
History and Physical Update    I have examined the patient and reviewed the history and physical that is present on this chart. The changes in the patient's history and physical condition are as follows:    None    Kulwinder Talley MD

## 2024-07-03 NOTE — BRIEF OP NOTE
[facility] Brief Operative Note    Pre-operative diagnosis: Prolapse of female genital organs [N81.9]   Post-operative diagnosis Same + severe pelvic adhesions   Procedure: Procedure(s):  ROBOTIC SACROCOLPOPEXY PARAVAGINAL REPAIR LYSIS OF ADHESIONS  POSTERIOR REPAIR   Surgeon(s): Surgeons and Role:     * Kulwinder Talley MD - Primary   Estimated blood loss: 30cc   Specimens: * No specimens in log *   Findings: Severe adhesions

## 2024-07-03 NOTE — ANESTHESIA PROCEDURE NOTES
Airway       Patient location during procedure: OR       Procedure Start/Stop Times: 7/3/2024 7:50 AM  Staff -        CRNA: Stormy Amaya APRN CRNA       Performed By: CRNAIndications and Patient Condition       Indications for airway management: roxana-procedural       Induction type:intravenous       Mask difficulty assessment: 1 - vent by mask    Final Airway Details       Final airway type: endotracheal airway       Successful airway: ETT - single  Endotracheal Airway Details        ETT size (mm): 7.5       Cuffed: yes       Successful intubation technique: direct laryngoscopy       DL Blade Type: MAC 3       Grade View of Cords: 1       Adjucts: stylet       Position: Right       Measured from: gums/teeth       Secured at (cm): 21       Bite block used: None    Post intubation assessment        Placement verified by: capnometry, equal breath sounds and chest rise        Number of attempts at approach: 1       Number of other approaches attempted: 0       Secured with: tape       Ease of procedure: easy       Dentition: Intact       Dental guard used and removed. Dental Guard Type: Standard White.    Medication(s) Administered   Medication Administration Time: 7/3/2024 7:50 AM

## 2024-07-03 NOTE — OR NURSING
Nurse and patient discussed discharging home versus an overnight admission to the hospital. Patient expressed desire to discharge home. Patient was informed that she will need an adult to stay with her over night, patient will also be required to void and have a reasonable under standing of pain controled. Patient verbalized understanding of these goals.

## 2024-07-03 NOTE — ANESTHESIA CARE TRANSFER NOTE
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    Anesthesia Type:   General     Note:      Level of Consciousness: awake  Oxygen Supplementation: face mask  Level of Supplemental Oxygen (L/min / FiO2): 6      Vital Signs Stable: post-procedure vital signs reviewed and stable  Report to RN Given: handoff report given  Patient transferred to: PACU    Handoff Report: Identifed the Patient, Identified the Reponsible Provider, Reviewed the pertinent medical history, Discussed the surgical course, Reviewed Intra-OP anesthesia mangement and issues during anesthesia, Set expectations for post-procedure period and Allowed opportunity for questions and acknowledgement of understanding      Vitals:  Vitals Value Taken Time   /71 07/03/24 1104   Temp 35.7  C (96.2  F) 07/03/24 1104   Pulse 79 07/03/24 1109   Resp 20 07/03/24 1109   SpO2 100 % 07/03/24 1109   Vitals shown include unfiled device data.    Electronically Signed By: RYLEY Yang CRNA  July 3, 2024  11:11 AM

## 2024-07-03 NOTE — ANESTHESIA PREPROCEDURE EVALUATION
Anesthesia Pre-Procedure Evaluation    Patient: Rajni Lott   MRN: [id number] : 1970        Procedure : Procedure(s):  ROBOTIC SACROCOLPOPEXY PARAVAGINAL REPAIR POSTERIOR REPAIR          Past Medical History:   Diagnosis Date     DVT (deep venous thrombosis) (H)      Low back pain      MTHFR mutation      PONV (postoperative nausea and vomiting)       Past Surgical History:   Procedure Laterality Date     BIOPSY BREAST Left 2006    LIPOMA      SECTION       LUMBAR DISCECTOMY Left 9/3/2015    Procedure: LEFT L4 L5 HEMILAMINECTOMY MICRODISCECTOMY;  Surgeon: Dinora Gonzalez MD;  Location: [facility];  Service:      [facility] TOTAL ABDOM HYSTERECTOMY      Description: Hysterectomy;  Recorded: 2014;      No Known Allergies   Social History     Tobacco Use     Smoking status: Never     Smokeless tobacco: Never   Substance Use Topics     Alcohol use: Not on file      Wt Readings from Last 1 Encounters:   24 75.8 kg (167 lb)        Anesthesia Evaluation   Pt has had prior anesthetic.     History of anesthetic complications  - PONV.      ROS/MED HX  ENT/Pulmonary:       Neurologic:       Cardiovascular:       METS/Exercise Tolerance:     Hematologic:     (+) History of blood clots,               Musculoskeletal:       GI/Hepatic:       Renal/Genitourinary:       Endo:       Psychiatric/Substance Use:       Infectious Disease:       Malignancy:       Other:            Physical Exam    Airway        Mallampati: II   TM distance: > 3 FB   Neck ROM: full     Respiratory Devices and Support         Dental       (+) Minor Abnormalities - some fillings, tiny chips      Cardiovascular   cardiovascular exam normal          Pulmonary   pulmonary exam normal            OUTSIDE LABS:  CBC:   Lab Results   Component Value Date    WBC 5.4 10/26/2020    WBC 5.1 2019    HGB 14.6 2024    HGB 14.0 10/26/2020    HCT 44.0 10/26/2020    HCT 41.1 2019     10/26/2020      "07/01/2019     BMP:   Lab Results   Component Value Date     07/01/2019    POTASSIUM 4.1 07/01/2019    CHLORIDE 105 07/01/2019    CO2 26 07/01/2019    BUN 15 07/01/2019    CR 0.70 07/01/2019    GLC 86 07/01/2019     COAGS: No results found for: \"PTT\", \"INR\", \"FIBR\"  POC:   Lab Results   Component Value Date    HCG Negative 07/03/2024     HEPATIC:   Lab Results   Component Value Date    ALBUMIN 4.0 07/01/2019    PROTTOTAL 7.2 07/01/2019    ALT 41 07/01/2019    AST 26 07/01/2019    ALKPHOS 57 07/01/2019    BILITOTAL 0.4 07/01/2019     OTHER:   Lab Results   Component Value Date    STARR 8.8 07/01/2019    TSH 1.76 10/26/2020    CRP <2.9 10/26/2020    SED 7 10/26/2020       Anesthesia Plan    ASA Status:  3    NPO Status:  NPO Appropriate    Anesthesia Type: General.     - Airway: ETT      Maintenance: TIVA.        Consents    Anesthesia Plan(s) and associated risks, benefits, and realistic alternatives discussed. Questions answered and patient/representative(s) expressed understanding.     - Discussed: Risks, Benefits and Alternatives for BOTH SEDATION and the PROCEDURE were discussed     - Discussed with:  Patient      - Extended Intubation/Ventilatory Support Discussed: No.      - Patient is DNR/DNI Status: No     Use of blood products discussed: No .     Postoperative Care    Pain management: IV analgesics, Oral pain medications.   PONV prophylaxis: Ondansetron (or other 5HT-3), Dexamethasone or Solumedrol, Scopolamine patch     Comments:    Other Comments: Scop patch  TIVA           Jonas Lynch DO    I have reviewed the pertinent notes and labs in the chart from the past 30 days and (re)examined the patient.  Any updates or changes from those notes are reflected in this note.              # Overweight: Estimated body mass index is 26.16 kg/m  as calculated from the following:    Height as of 7/1/24: 1.702 m (5' 7\").    Weight as of this encounter: 75.8 kg (167 lb).      "

## 2024-07-03 NOTE — ANESTHESIA POSTPROCEDURE EVALUATION
Patient: Rajni Lott    Procedure: Procedure(s):  ROBOTIC SACROCOLPOPEXY PARAVAGINAL REPAIR LYSIS OF ADHESIONS  POSTERIOR REPAIR       Anesthesia Type:  General    Note:  Disposition: Outpatient   Postop Pain Control: Uneventful            Sign Out: Well controlled pain   PONV: No   Neuro/Psych: Uneventful            Sign Out: Acceptable/Baseline neuro status   Airway/Respiratory: Uneventful            Sign Out: Acceptable/Baseline resp. status   CV/Hemodynamics: Uneventful            Sign Out: Acceptable CV status; No obvious hypovolemia; No obvious fluid overload   Other NRE: NONE   DID A NON-ROUTINE EVENT OCCUR? No    Event details/Postop Comments:  Patient recovering comfortably.        Last vitals:  Vitals Value Taken Time   /61 07/03/24 1215   Temp 35.7  C (96.2  F) 07/03/24 1104   Pulse 79 07/03/24 1215   Resp 12 07/03/24 1215   SpO2 100 % 07/03/24 1215       Electronically Signed By: Jonas Lynch DO  July 3, 2024  2:53 PM

## 2024-07-07 NOTE — OP NOTE
Patient Name: Rajni Lott  Patient MRN:[id number]  Patient : 1970    [facility]    Operative Report   Date: 7/3/2024    Pre-operative diagnosis: Pelvic floor prolapse with large cystocele and rectocele      Post-operative diagnosis: Same with modifier 22 for 30 minutes of lysis of adhesions.      Procedure: Robotic sacrocolpopexy, bilateral paravaginal defect repair, enterolysis of adhesions, posterior colporrhaphy.       Attending Surgeon: Kulwinder Talley MD  Urogynecology and Reconstructive Pelvic Surgery        Surgical Assist: NOEL Jason        Anesthesia: General      Estimated Blood Loss: 30 cc      Drains: None      Specimens: None      Findings: There were severe bowel adhesions to the vaginal cuff and pelvic sidewalls.  A large anterior defect was noted.  As well as a large rectocele.  Apical support was moderate.      Complications: None      Implants: A couple Plast Restoril graft was placed to the vaginal apex and subsequently to the sacral promontory.      Immediate postoperative plan: Stable to home      Indication for procedure: This is a 53-year-old female status post hysterectomy with significant vaginal prolapse.  She was given informed consent for the above procedure the risk benefits and alternatives were discussed at length.  She expressed understanding and wished to proceed.      Details procedure and intraoperative findings: There were severe adhesions between the bowel and the adnexa as well as to the vaginal apex.  In addition the adhesions were between bowel.  There was a significant amount of time spent lysing adhesions in order to get access to the sacral promontory.    I did a briefing was held preoperatively and the patient and the procedure as well as allergies were identified.  The patient was then taken to the operating room and after induction of general anesthesia was prepped and draped in the dorsolithotomy position.  A  timeout was called and the patient and the procedure were verified.  A Gomez was placed as well as a HUMI catheter and attention was directed to the abdomen.  A supraumbilical incision was made and a Veress needle was inserted.  The abdomen was insufflated with 3 L of CO2.  An 8 mm trocar and trocar sheath was then inserted.  This was done under direct visualization.  2 additional incisions were then made to the right and to the left the appropriate robotic trocars were placed.  The patient was placed in steep Trendelenburg and the robot was docked.  I then took my place at the console and inspected the anatomy.    Approximately 30 minutes were spent lysing adhesions the started from a lateral to medial approach.  The adhesions were taken off each adnexa.  The ureters were identified transperitoneally throughout.  Once this was done the adhesions between small bowel and colon were taken down so that ultimately the peritoneum overlying the sacral promontory was exposed.    The peritoneum over the vaginal apex was then identified and dissected off.  The bladder flap was taken down.  And the peritoneum was taken off the posterior vaginal vault.  A Coloplast Y-shaped mesh was then introduced and fixed both anteriorly and posteriorly.  This was done with interrupted [place]-Christofer sutures.  Approximately 9 were placed anteriorly and 6 posteriorly.  The peritoneum was then tunneled and the long arm of the mesh was brought up to the sacral promontory where it was fixed using 3 interrupted [place]-Christofer sutures.  It was then appropriately trimmed.    Copious amounts of irrigation were used good hemostasis was noted.  The entire area was reperitonealized using a V-Loc suture.  The bladder was then filled with 250 cc of saline and the space of Retzius was opened using a monopolar scissors.  This dissection was carried out laterally and retropubic lead.  The obturator foramen was identified.  The bilateral paravaginal defect was  identified and this was reduced using a V-Loc suture reapproximating the vaginal fascia to the arcus tendineus bilaterally.  Excellent support was obtained in this manner.  The peritoneum was then reperitonealized using a V-Loc suture.  The robot was undocked the trocars were removed.  The incisions were closed with Monocryl and Dermabond.      Attention was then directed vaginally where the perineal body was injected with 1% lidocaine with epinephrine.  The same material was used to inject the posterior vaginal mucosa.  Allis clamps were placed at 5 and 7:00 on the perineal body.  A V-type incision was then made between the 2 Allis clamps and dissection was carried out cephalad.  The vaginal mucosa was then dissected free from the underlying fascia and the dissection was carried out lateral to the levators.    2-0 Vicryl sutures were then used in an interrupted manner to bring the fascia to the midline and this was done in 3 layers.  The perineal body was then closed using the same sutures.  An 0 Vicryl was used to close the vaginal mucosa and a 3-0 Vicryl was used to close the subcuticular portion of the perineal body.  Copious amounts of irrigation were used good hemostasis was noted.    The decision was made to terminate the procedure a debriefing was held and the patient and the procedure were agreed upon.  Patient was taken to the recovery room in good condition    Kulwinder Talley MD  Urogynecology and Reconstructive Pelvic Surgery                    CC:Kulwinder Talley MD  Female Pelvic Medicine and Reconstructive Surgery

## 2024-07-11 ENCOUNTER — LAB REQUISITION (OUTPATIENT)
Dept: LAB | Facility: CLINIC | Age: 54
End: 2024-07-11
Payer: COMMERCIAL

## 2024-07-11 DIAGNOSIS — G89.18 OTHER ACUTE POSTPROCEDURAL PAIN: ICD-10-CM

## 2024-07-11 LAB
ERYTHROCYTE [DISTWIDTH] IN BLOOD BY AUTOMATED COUNT: 12.9 % (ref 10–15)
HCT VFR BLD AUTO: 39.5 % (ref 35–47)
HGB BLD-MCNC: 12.8 G/DL (ref 11.7–15.7)
MCH RBC QN AUTO: 31.6 PG (ref 26.5–33)
MCHC RBC AUTO-ENTMCNC: 32.4 G/DL (ref 31.5–36.5)
MCV RBC AUTO: 98 FL (ref 78–100)
PLATELET # BLD AUTO: 284 10E3/UL (ref 150–450)
RBC # BLD AUTO: 4.05 10E6/UL (ref 3.8–5.2)
WBC # BLD AUTO: 8.7 10E3/UL (ref 4–11)

## 2024-07-11 PROCEDURE — 85027 COMPLETE CBC AUTOMATED: CPT | Mod: ORL | Performed by: OBSTETRICS & GYNECOLOGY

## 2024-10-25 ENCOUNTER — TRANSCRIBE ORDERS (OUTPATIENT)
Dept: CT IMAGING | Facility: HOSPITAL | Age: 54
End: 2024-10-25
Payer: COMMERCIAL

## 2024-10-25 ENCOUNTER — HOSPITAL ENCOUNTER (OUTPATIENT)
Dept: CT IMAGING | Facility: HOSPITAL | Age: 54
Discharge: HOME | End: 2024-10-25
Admitting: OBSTETRICS & GYNECOLOGY
Payer: COMMERCIAL

## 2024-10-25 ENCOUNTER — LAB REQUISITION (OUTPATIENT)
Dept: LAB | Facility: CLINIC | Age: 54
End: 2024-10-25
Payer: COMMERCIAL

## 2024-10-25 DIAGNOSIS — R10.9 UNSPECIFIED ABDOMINAL PAIN: ICD-10-CM

## 2024-10-25 DIAGNOSIS — R10.9 PAIN, ABDOMINAL: Primary | ICD-10-CM

## 2024-10-25 LAB
BASOPHILS # BLD AUTO: 0.1 10E3/UL (ref 0–0.2)
BASOPHILS NFR BLD AUTO: 1 %
EOSINOPHIL # BLD AUTO: 0.4 10E3/UL (ref 0–0.7)
EOSINOPHIL NFR BLD AUTO: 8 %
ERYTHROCYTE [DISTWIDTH] IN BLOOD BY AUTOMATED COUNT: 12.1 % (ref 10–15)
HCT VFR BLD AUTO: 41.9 % (ref 35–47)
HGB BLD-MCNC: 13.3 G/DL (ref 11.7–15.7)
IMM GRANULOCYTES # BLD: 0 10E3/UL
IMM GRANULOCYTES NFR BLD: 0 %
LYMPHOCYTES # BLD AUTO: 1.9 10E3/UL (ref 0.8–5.3)
LYMPHOCYTES NFR BLD AUTO: 35 %
MCH RBC QN AUTO: 30.5 PG (ref 26.5–33)
MCHC RBC AUTO-ENTMCNC: 31.7 G/DL (ref 31.5–36.5)
MCV RBC AUTO: 96 FL (ref 78–100)
MONOCYTES # BLD AUTO: 0.4 10E3/UL (ref 0–1.3)
MONOCYTES NFR BLD AUTO: 8 %
NEUTROPHILS # BLD AUTO: 2.5 10E3/UL (ref 1.6–8.3)
NEUTROPHILS NFR BLD AUTO: 48 %
NRBC # BLD AUTO: 0 10E3/UL
NRBC BLD AUTO-RTO: 0 /100
PLATELET # BLD AUTO: 213 10E3/UL (ref 150–450)
RBC # BLD AUTO: 4.36 10E6/UL (ref 3.8–5.2)
WBC # BLD AUTO: 5.3 10E3/UL (ref 4–11)

## 2024-10-25 PROCEDURE — 250N000011 HC RX IP 250 OP 636: Performed by: OBSTETRICS & GYNECOLOGY

## 2024-10-25 PROCEDURE — 250N000009 HC RX 250: Performed by: OBSTETRICS & GYNECOLOGY

## 2024-10-25 PROCEDURE — 80053 COMPREHEN METABOLIC PANEL: CPT | Mod: ORL | Performed by: OBSTETRICS & GYNECOLOGY

## 2024-10-25 PROCEDURE — 74178 CT ABD&PLV WO CNTR FLWD CNTR: CPT

## 2024-10-25 PROCEDURE — 85025 COMPLETE CBC W/AUTO DIFF WBC: CPT | Mod: ORL | Performed by: OBSTETRICS & GYNECOLOGY

## 2024-10-25 RX ORDER — IOPAMIDOL 755 MG/ML
82 INJECTION, SOLUTION INTRAVASCULAR ONCE
Status: COMPLETED | OUTPATIENT
Start: 2024-10-25 | End: 2024-10-25

## 2024-10-25 RX ADMIN — SODIUM CHLORIDE 71 ML: 9 INJECTION, SOLUTION INTRAVENOUS at 14:02

## 2024-10-25 RX ADMIN — IOPAMIDOL 82 ML: 755 INJECTION, SOLUTION INTRAVENOUS at 13:53

## 2024-10-26 LAB
ALBUMIN SERPL BCG-MCNC: 4.3 G/DL (ref 3.5–5.2)
ALP SERPL-CCNC: 80 U/L (ref 40–150)
ALT SERPL W P-5'-P-CCNC: 64 U/L (ref 0–50)
ANION GAP SERPL CALCULATED.3IONS-SCNC: 10 MMOL/L (ref 7–15)
AST SERPL W P-5'-P-CCNC: 45 U/L (ref 0–45)
BILIRUB SERPL-MCNC: 0.2 MG/DL
BUN SERPL-MCNC: 21.1 MG/DL (ref 6–20)
CALCIUM SERPL-MCNC: 9.1 MG/DL (ref 8.8–10.4)
CHLORIDE SERPL-SCNC: 101 MMOL/L (ref 98–107)
CREAT SERPL-MCNC: 0.72 MG/DL (ref 0.51–0.95)
EGFRCR SERPLBLD CKD-EPI 2021: >90 ML/MIN/1.73M2
GLUCOSE SERPL-MCNC: 90 MG/DL (ref 70–99)
HCO3 SERPL-SCNC: 24 MMOL/L (ref 22–29)
POTASSIUM SERPL-SCNC: 4.6 MMOL/L (ref 3.4–5.3)
PROT SERPL-MCNC: 6.8 G/DL (ref 6.4–8.3)
SODIUM SERPL-SCNC: 135 MMOL/L (ref 135–145)

## 2024-12-17 ENCOUNTER — OFFICE VISIT (OUTPATIENT)
Dept: FAMILY MEDICINE | Facility: CLINIC | Age: 54
End: 2024-12-17
Payer: COMMERCIAL

## 2024-12-17 VITALS
TEMPERATURE: 98.2 F | WEIGHT: 172.4 LBS | HEIGHT: 67 IN | HEART RATE: 73 BPM | OXYGEN SATURATION: 96 % | SYSTOLIC BLOOD PRESSURE: 104 MMHG | DIASTOLIC BLOOD PRESSURE: 55 MMHG | RESPIRATION RATE: 20 BRPM | BODY MASS INDEX: 27.06 KG/M2

## 2024-12-17 DIAGNOSIS — Z01.818 PREOP GENERAL PHYSICAL EXAM: Primary | ICD-10-CM

## 2024-12-17 DIAGNOSIS — R10.2 PELVIC PAIN IN FEMALE: ICD-10-CM

## 2024-12-17 DIAGNOSIS — Z15.89 MTHFR GENE MUTATION: ICD-10-CM

## 2024-12-17 PROCEDURE — 99213 OFFICE O/P EST LOW 20 MIN: CPT | Performed by: PHYSICIAN ASSISTANT

## 2024-12-17 RX ORDER — SEMAGLUTIDE 0.68 MG/ML
0.25 INJECTION, SOLUTION SUBCUTANEOUS
COMMUNITY
Start: 2024-11-05

## 2024-12-17 RX ORDER — ESTRADIOL 0.1 MG/D
1 FILM, EXTENDED RELEASE TRANSDERMAL
COMMUNITY

## 2024-12-17 NOTE — PROGRESS NOTES
Preoperative Evaluation  [facility]  [address] [facility] [address]  Phone: [phone]  Fax: [phone]  Primary Provider: Bailey Cerda MD  Pre-op Performing Provider: Judi Orellana PA-C  Dec 17, 2024             12/17/2024   Surgical Information   What procedure is being done? oophrectomy    [facility] or [facility]    Who is doing the procedure / surgery? dr carrizales    Date of surgery / procedure: 12/23/2024    Time of surgery / procedure: 730    Where do you plan to recover after surgery? at home with family        Patient-reported     Fax number for surgical facility: Note does not need to be faxed, will be available electronically in Epic.    Assessment & Plan     The proposed surgical procedure is considered INTERMEDIATE risk.    Preop general physical exam  Pelvic pain in female  Patient here today for pre op.  NPO recommendations reviewed.  Medications reviewed.  No labs or EKG indicated, has future labs day of surgery.    MTHFR gene mutation  Surgeon aware. No recent clots.              - No identified additional risk factors other than previously addressed    Preoperative Medication Instructions  Antiplatelet or Anticoagulation Medication Instructions   - Patient is on no antiplatelet or anticoagulation medications.    Additional Medication Instructions   - GLP-1 Injectable (exenitide, liraglutide, semaglutide, dulaglutide, etc.): DO NOT TAKE 7 days before surgery   Last dose: 12/15/24    Vivelle- take off morning of surgery.    Hold Valtrex morning of surgery.    Tylenol is okay for pain.        Recommendation  Approval given to proceed with proposed procedure, without further diagnostic evaluation.    Deanna Urban is a 54 year old, presenting for the following:  Pre-Op Exam          12/17/2024     8:06 AM   Additional Questions   Roomed by PARAMJIT Benavides CMA(AAMA)         12/17/2024    Declines Weight   Did patient decline having their weight taken? Yes        Via the Health Maintenance questionnaire, the patient has reported the following services have been completed -Cervical Cancer Screening: [facility] 2024-02-01, this information has been sent to the abstraction team.      HPI related to upcoming procedure: patient has persistent pelvic pain, having ovaries and tubes removed to see if alleviates pain        12/17/2024   Pre-Op Questionnaire   Have you ever had a heart attack or stroke? No    Have you ever had surgery on your heart or blood vessels, such as a stent placement, a coronary artery bypass, or surgery on an artery in your head, neck, heart, or legs? No    Do you have chest pain with activity? No    Do you have a history of heart failure? No    Do you currently have a cold, bronchitis or symptoms of other infection? No    Do you have a cough, shortness of breath, or wheezing? No    Do you or anyone in your family have previous history of blood clots? (!) YES - patient has had blood clots. she had a clot after her hysterectomy in 2012/2013. Found to have MTHFR gene mutation. All other clotting tests negative. Was on warfarin and coumadin for 6 months. No clots since.  surgeon aware    Do you or does anyone in your family have a serious bleeding problem such as prolonged bleeding following surgeries or cuts? No    Have you ever had problems with anemia or been told to take iron pills? No    Have you had any abnormal blood loss such as black, tarry or bloody stools, or abnormal vaginal bleeding? No    Have you ever had a blood transfusion? (!) YES    Have you ever had a transfusion reaction? No    Are you willing to have a blood transfusion if it is medically needed before, during, or after your surgery? Yes    Have you or any of your relatives ever had problems with anesthesia? No    Do you have sleep apnea, excessive snoring or daytime drowsiness? No    Do you have any artifical  heart valves or other implanted medical devices like a pacemaker, defibrillator, or continuous glucose monitor? No    Do you have artificial joints? No    Are you allergic to latex? (!) YES- sensitivity        Patient-reported     Health Care Directive  Patient does not have a Health Care Directive: Discussed advance care planning with patient; however, patient declined at this time.    Preoperative Review of    reviewed - no record of controlled substances prescribed.      Status of Chronic Conditions:  See problem list for active medical problems.  Problems all longstanding and stable, except as noted/documented.  See ROS for pertinent symptoms related to these conditions.    Patient Active Problem List    Diagnosis Date Noted    History of hysterectomy 2019     Priority: Medium    History of deep vein thrombosis (DVT) of lower extremity 2019     Priority: Medium    MTHFR gene mutation 2019     Priority: Medium    H/O laminectomy 2019     Priority: Medium      Past Medical History:   Diagnosis Date    DVT (deep venous thrombosis) (H)     Low back pain     MTHFR mutation     PONV (postoperative nausea and vomiting)      Past Surgical History:   Procedure Laterality Date    ABDOMINAL SACROCOLPOPEXY N/A 7/3/2024    Procedure: ROBOTIC SACROCOLPOPEXY PARAVAGINAL REPAIR;  Surgeon: Kulwinder Talley MD;  Location: [facility] OR    BIOPSY BREAST Left     LIPOMA     SECTION      COLPORRHAPHY POSTERIOR N/A 7/3/2024    Procedure: POSTERIOR REPAIR;  Surgeon: Kulwinder Talley MD;  Location: [facility] OR    LUMBAR DISCECTOMY Left 9/3/2015    Procedure: LEFT L4 L5 HEMILAMINECTOMY MICRODISCECTOMY;  Surgeon: Dinora Gonzalez MD;  Location: [facility];  Service:     LYSIS, ADHESIONS, ROBOT-ASSISTED, LAPAROSCOPIC, USING DA REYNA XI N/A 7/3/2024    Procedure: WITH LYSIS OF ADHESIONS;  Surgeon: Kulwinder Talley MD;  Location: [facility]  "   Z TOTAL ABDOM HYSTERECTOMY      Description: Hysterectomy;  Recorded: 12/03/2014;     Current Outpatient Medications   Medication Sig Dispense Refill    clobetasol (TEMOVATE) 0.05 % external ointment Apply topically 2 times daily as needed (APPLY TWICE DAILY AS NEEDED FOR RASH ON TRUNK AND EXTREMITIES. DO NOT APPLY TO FACE OR GROIN.)      estradiol (VIVELLE-DOT) 0.1 MG/24HR bi-weekly patch Place 1 patch onto the skin twice a week.      semaglutide (OZEMPIC, 0.25 OR 0.5 MG/DOSE,) 2 MG/3ML pen Inject 0.25 mg subcutaneously every 7 days.      valACYclovir (VALTREX) 500 MG tablet Take 2 tablets (1,000 mg) by mouth 3 times daily 540 tablet 3    ondansetron (ZOFRAN ODT) 4 MG ODT tab Take 1 tablet (4 mg) by mouth every 8 hours as needed for nausea 4 tablet 0    oxyCODONE (ROXICODONE) 5 MG tablet Take 1-2 tablets (5-10 mg) by mouth every 4 hours as needed for moderate to severe pain 16 tablet 0       Allergies   Allergen Reactions    Adhesive Tape Other (See Comments)    Latex Other (See Comments)        Social History     Tobacco Use    Smoking status: Never     Passive exposure: Never    Smokeless tobacco: Never   Substance Use Topics    Alcohol use: Not on file     History   Drug Use Not on file             Review of Systems  Constitutional, HEENT, cardiovascular, pulmonary, gi and gu systems are negative, except as otherwise noted.    Objective    /55   Pulse 73   Temp 98.2  F (36.8  C) (Oral)   Resp 20   Ht 1.689 m (5' 6.5\")   Wt 78.2 kg (172 lb 6.4 oz)   SpO2 96%   BMI 27.41 kg/m     Estimated body mass index is 27.41 kg/m  as calculated from the following:    Height as of this encounter: 1.689 m (5' 6.5\").    Weight as of this encounter: 78.2 kg (172 lb 6.4 oz).  Physical Exam  GENERAL: alert and no distress  NECK: no adenopathy, no asymmetry, masses, or scars  RESP: lungs clear to auscultation - no rales, rhonchi or wheezes  CV: regular rate and rhythm, normal S1 S2, no S3 or S4, no murmur, click " or rub, no peripheral edema  ABDOMEN: soft, nontender, no hepatosplenomegaly, no masses and bowel sounds normal  MS: no gross musculoskeletal defects noted, no edema    Recent Labs   Lab Test 10/25/24  1415 07/11/24  1709   HGB 13.3 12.8    284     --    POTASSIUM 4.6  --    CR 0.72  --         Diagnostics  No labs were ordered during this visit.   No EKG required, no history of coronary heart disease, significant arrhythmia, peripheral arterial disease or other structural heart disease.    Revised Cardiac Risk Index (RCRI)  The patient has the following serious cardiovascular risks for perioperative complications:   - No serious cardiac risks = 0 points     RCRI Interpretation: 0 points: Class I (very low risk - 0.4% complication rate)         Signed Electronically by: Judi Orellana PA-C  A copy of this evaluation report is provided to the requesting physician.

## 2024-12-17 NOTE — PATIENT INSTRUCTIONS
How to Take Your Medication Before Surgery  Preoperative Medication Instructions   Antiplatelet or Anticoagulation Medication Instructions   - Patient is on no antiplatelet or anticoagulation medications.    Additional Medication Instructions   - GLP-1 Injectable (exenitide, liraglutide, semaglutide, dulaglutide, etc.): DO NOT TAKE 7 days before surgery   Last dose: 12/15/24    Vivelle- take off morning of surgery.    Hold Valtrex morning of surgery.    Tylenol is okay for pain.             Patient Education   Preparing for Your Surgery  For Adults  Getting started  In most cases, a nurse will call to review your health history and instructions. They will give you an arrival time based on your scheduled surgery time. Please be ready to share:  Your doctor's clinic name and phone number  Your medical, surgical, and anesthesia history  A list of allergies and sensitivities  A list of medicines, including herbal treatments and over-the-counter drugs  Whether the patient has a legal guardian (ask how to send us the papers in advance)  Note: You may not receive a call if you were seen at our PAC (Preoperative Assessment Center).  Please tell us if you're pregnant--or if there's any chance you might be pregnant. Some surgeries may injure a fetus (unborn baby), so they require a pregnancy test. Surgeries that are safe for a fetus don't always need a test, and you can choose whether to have one.   Preparing for surgery  Within 10 to 30 days of surgery: Have a pre-op exam (sometimes called an H&P, or History and Physical). This can be done at a clinic or pre-operative center.  If you're having a , you may not need this exam. Talk to your care team.  At your pre-op exam, talk to your care team about all medicines you take. (This includes CBD oil and any drugs, such as THC, marijuana, and other forms of cannabis.) If you need to stop any medicine before surgery, ask when to start taking it again.  This is for your  safety. Many medicines and drugs can make you bleed too much during surgery. Some change how well surgery (anesthesia) drugs work.  Call your insurance company to let them know you're having surgery. (If you don't have insurance, call [phone].)  Call your clinic if there's any change in your health. This includes a scrape or scratch near the surgery site, or any signs of a cold (sore throat, runny nose, cough, rash, fever).  Eating and drinking guidelines  For your safety: Unless your surgeon tells you otherwise, follow the guidelines below.  Eat and drink as normal until 8 hours before you arrive for surgery. After that, no food or milk. You can spit out gum when you arrive.  Drink clear liquids until 2 hours before you arrive. These are liquids you can see through, like water, Gatorade, and Propel Water. They also include plain black coffee and tea (no cream or milk).  No alcohol for 24 hours before you arrive. The night before surgery, stop any drinks that contain THC.  If your care team tells you to take medicine on the morning of surgery, it's okay to take it with a sip of water. No other medicines or drugs are allowed (including CBD oil)--follow your care team's instructions.  If you have questions the day of surgery, call your hospital or surgery center.   Preventing infection  Shower or bathe the night before and the morning of surgery. Follow the instructions your clinic gave you. (If no instructions, use regular soap.)  Don't shave or clip hair near your surgery site. We'll remove the hair if needed.  Don't smoke or vape the morning of surgery. No chewing tobacco for 6 hours before you arrive. A nicotine patch is okay. You may spit out nicotine gum when you arrive.  For some surgeries, the surgeon will tell you to fully quit smoking and nicotine.  We will make every effort to keep you safe from infection. We will:  Clean our hands often with soap and water (or an alcohol-based hand rub).  Clean the  skin at your surgery site with a special soap that kills germs.  Give you a special gown to keep you warm. (Cold raises the risk of infection.)  Wear hair covers, masks, gowns, and gloves during surgery.  Give antibiotic medicine, if prescribed. Not all surgeries need this medicine.  What to bring on the day of surgery  Photo ID and insurance card  Copy of your health care directive, if you have one  Glasses and hearing aids (bring cases)  You can't wear contacts during surgery  Inhaler and eye drops, if you use them (tell us about these when you arrive)  CPAP machine or breathing device, if you use them  A few personal items, if spending the night  If you have . . .  A pacemaker, ICD (cardiac defibrillator), or other implant: Bring the ID card.  An implanted stimulator: Bring the remote control.  A legal guardian: Bring a copy of the certified (court-stamped) guardianship papers.  Please remove any jewelry, including body piercings. Leave jewelry and other valuables at home.  If you're going home the day of surgery  You must have a responsible adult drive you home. They should stay with you overnight as well.  If you don't have someone to stay with you, and you aren't safe to go home alone, we may keep you overnight. Insurance often won't pay for this.  After surgery  If it's hard to control your pain or you need more pain medicine, please call your surgeon's office.  Questions?   If you have any questions for your care team, list them here:   ____________________________________________________________________________________________________________________________________________________________________________________________________________________________________________________________  For informational purposes only. Not to replace the advice of your health care provider. Copyright   2003, 2019 [facility]. All rights reserved. Clinically reviewed by Luis Smith MD. [employer] [id number] -  REV 08/24.

## 2024-12-18 NOTE — OR NURSING
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 A ride, and basic pre op info was verified as pt stated she knew the rest.   "

## 2024-12-20 ENCOUNTER — ANESTHESIA EVENT (OUTPATIENT)
Dept: SURGERY | Facility: CLINIC | Age: 54
End: 2024-12-20
Payer: COMMERCIAL

## 2024-12-23 ENCOUNTER — HOSPITAL ENCOUNTER (OUTPATIENT)
Facility: CLINIC | Age: 54
Discharge: HOME | End: 2024-12-23
Attending: OBSTETRICS & GYNECOLOGY | Admitting: OBSTETRICS & GYNECOLOGY
Payer: COMMERCIAL

## 2024-12-23 ENCOUNTER — ANESTHESIA (OUTPATIENT)
Dept: SURGERY | Facility: CLINIC | Age: 54
End: 2024-12-23
Payer: COMMERCIAL

## 2024-12-23 VITALS
RESPIRATION RATE: 16 BRPM | HEIGHT: 67 IN | OXYGEN SATURATION: 97 % | BODY MASS INDEX: 27 KG/M2 | SYSTOLIC BLOOD PRESSURE: 117 MMHG | WEIGHT: 172 LBS | DIASTOLIC BLOOD PRESSURE: 64 MMHG | HEART RATE: 83 BPM | TEMPERATURE: 97.5 F

## 2024-12-23 DIAGNOSIS — Z90.710 HISTORY OF HYSTERECTOMY: Primary | ICD-10-CM

## 2024-12-23 LAB
ABO + RH BLD: NORMAL
BASOPHILS # BLD AUTO: 0.1 10E3/UL (ref 0–0.2)
BASOPHILS NFR BLD AUTO: 1 %
BLD GP AB SCN SERPL QL: NEGATIVE
EOSINOPHIL # BLD AUTO: 0.3 10E3/UL (ref 0–0.7)
EOSINOPHIL NFR BLD AUTO: 5 %
ERYTHROCYTE [DISTWIDTH] IN BLOOD BY AUTOMATED COUNT: 13.4 % (ref 10–15)
HCG UR QL: NEGATIVE
HCT VFR BLD AUTO: 38.9 % (ref 35–47)
HGB BLD-MCNC: 13.2 G/DL (ref 11.7–15.7)
IMM GRANULOCYTES # BLD: 0 10E3/UL
IMM GRANULOCYTES NFR BLD: 1 %
LYMPHOCYTES # BLD AUTO: 1.7 10E3/UL (ref 0.8–5.3)
LYMPHOCYTES NFR BLD AUTO: 27 %
MCH RBC QN AUTO: 30.7 PG (ref 26.5–33)
MCHC RBC AUTO-ENTMCNC: 33.9 G/DL (ref 31.5–36.5)
MCV RBC AUTO: 91 FL (ref 78–100)
MONOCYTES # BLD AUTO: 0.5 10E3/UL (ref 0–1.3)
MONOCYTES NFR BLD AUTO: 9 %
NEUTROPHILS # BLD AUTO: 3.5 10E3/UL (ref 1.6–8.3)
NEUTROPHILS NFR BLD AUTO: 57 %
NRBC # BLD AUTO: 0 10E3/UL
NRBC BLD AUTO-RTO: 0 /100
PLATELET # BLD AUTO: 238 10E3/UL (ref 150–450)
RBC # BLD AUTO: 4.3 10E6/UL (ref 3.8–5.2)
SPECIMEN EXP DATE BLD: NORMAL
WBC # BLD AUTO: 6.1 10E3/UL (ref 4–11)

## 2024-12-23 PROCEDURE — 88305 TISSUE EXAM BY PATHOLOGIST: CPT | Mod: TC | Performed by: OBSTETRICS & GYNECOLOGY

## 2024-12-23 PROCEDURE — 258N000003 HC RX IP 258 OP 636: Performed by: OBSTETRICS & GYNECOLOGY

## 2024-12-23 PROCEDURE — 250N000013 HC RX MED GY IP 250 OP 250 PS 637: Performed by: ANESTHESIOLOGY

## 2024-12-23 PROCEDURE — 81025 URINE PREGNANCY TEST: CPT | Performed by: NURSE PRACTITIONER

## 2024-12-23 PROCEDURE — 250N000009 HC RX 250: Performed by: ANESTHESIOLOGY

## 2024-12-23 PROCEDURE — 36415 COLL VENOUS BLD VENIPUNCTURE: CPT | Performed by: NURSE PRACTITIONER

## 2024-12-23 PROCEDURE — 250N000011 HC RX IP 250 OP 636: Performed by: OBSTETRICS & GYNECOLOGY

## 2024-12-23 PROCEDURE — 710N000010 HC RECOVERY PHASE 1, LEVEL 2, PER MIN: Performed by: OBSTETRICS & GYNECOLOGY

## 2024-12-23 PROCEDURE — 250N000011 HC RX IP 250 OP 636: Performed by: ANESTHESIOLOGY

## 2024-12-23 PROCEDURE — 258N000003 HC RX IP 258 OP 636: Performed by: ANESTHESIOLOGY

## 2024-12-23 PROCEDURE — 370N000017 HC ANESTHESIA TECHNICAL FEE, PER MIN: Performed by: OBSTETRICS & GYNECOLOGY

## 2024-12-23 PROCEDURE — 85004 AUTOMATED DIFF WBC COUNT: CPT | Performed by: NURSE PRACTITIONER

## 2024-12-23 PROCEDURE — 250N000011 HC RX IP 250 OP 636: Performed by: NURSE ANESTHETIST, CERTIFIED REGISTERED

## 2024-12-23 PROCEDURE — 272N000001 HC OR GENERAL SUPPLY STERILE: Performed by: OBSTETRICS & GYNECOLOGY

## 2024-12-23 PROCEDURE — 258N000003 HC RX IP 258 OP 636: Performed by: NURSE ANESTHETIST, CERTIFIED REGISTERED

## 2024-12-23 PROCEDURE — 86900 BLOOD TYPING SEROLOGIC ABO: CPT | Performed by: NURSE PRACTITIONER

## 2024-12-23 PROCEDURE — 85049 AUTOMATED PLATELET COUNT: CPT | Performed by: NURSE PRACTITIONER

## 2024-12-23 PROCEDURE — 250N000009 HC RX 250: Performed by: NURSE ANESTHETIST, CERTIFIED REGISTERED

## 2024-12-23 PROCEDURE — 999N000141 HC STATISTIC PRE-PROCEDURE NURSING ASSESSMENT: Performed by: OBSTETRICS & GYNECOLOGY

## 2024-12-23 PROCEDURE — 360N000080 HC SURGERY LEVEL 7, PER MIN: Performed by: OBSTETRICS & GYNECOLOGY

## 2024-12-23 PROCEDURE — 710N000012 HC RECOVERY PHASE 2, PER MINUTE: Performed by: OBSTETRICS & GYNECOLOGY

## 2024-12-23 RX ORDER — VALACYCLOVIR HYDROCHLORIDE 1 G/1
1000 TABLET, FILM COATED ORAL 3 TIMES DAILY PRN
COMMUNITY

## 2024-12-23 RX ORDER — ACETAMINOPHEN 325 MG/1
975 TABLET ORAL ONCE
Status: DISCONTINUED | OUTPATIENT
Start: 2024-12-23 | End: 2024-12-23 | Stop reason: HOSPADM

## 2024-12-23 RX ORDER — PROPOFOL 10 MG/ML
INJECTION, EMULSION INTRAVENOUS PRN
Status: DISCONTINUED | OUTPATIENT
Start: 2024-12-23 | End: 2024-12-23

## 2024-12-23 RX ORDER — OXYCODONE HYDROCHLORIDE 5 MG/1
5 TABLET ORAL
Status: DISCONTINUED | OUTPATIENT
Start: 2024-12-23 | End: 2024-12-23 | Stop reason: HOSPADM

## 2024-12-23 RX ORDER — ESTRADIOL VALERATE 40 MG/ML
40 INJECTION INTRAMUSCULAR ONCE
Qty: 1 ML | Refills: 0 | Status: SHIPPED | OUTPATIENT
Start: 2024-12-23 | End: 2024-12-23

## 2024-12-23 RX ORDER — HYDROMORPHONE HCL IN WATER/PF 6 MG/30 ML
0.2 PATIENT CONTROLLED ANALGESIA SYRINGE INTRAVENOUS EVERY 5 MIN PRN
Status: DISCONTINUED | OUTPATIENT
Start: 2024-12-23 | End: 2024-12-23 | Stop reason: HOSPADM

## 2024-12-23 RX ORDER — SCOLOPAMINE TRANSDERMAL SYSTEM 1 MG/1
1 PATCH, EXTENDED RELEASE TRANSDERMAL ONCE
Status: DISCONTINUED | OUTPATIENT
Start: 2024-12-23 | End: 2024-12-23 | Stop reason: HOSPADM

## 2024-12-23 RX ORDER — FENTANYL CITRATE 50 UG/ML
25 INJECTION, SOLUTION INTRAMUSCULAR; INTRAVENOUS EVERY 5 MIN PRN
Status: DISCONTINUED | OUTPATIENT
Start: 2024-12-23 | End: 2024-12-23 | Stop reason: HOSPADM

## 2024-12-23 RX ORDER — OXYCODONE HYDROCHLORIDE 5 MG/1
5-10 TABLET ORAL EVERY 4 HOURS PRN
Qty: 12 TABLET | Refills: 0 | Status: SHIPPED | OUTPATIENT
Start: 2024-12-23

## 2024-12-23 RX ORDER — ONDANSETRON 4 MG/1
4 TABLET, ORALLY DISINTEGRATING ORAL EVERY 30 MIN PRN
Status: DISCONTINUED | OUTPATIENT
Start: 2024-12-23 | End: 2024-12-23 | Stop reason: HOSPADM

## 2024-12-23 RX ORDER — HYDROMORPHONE HCL IN WATER/PF 6 MG/30 ML
0.4 PATIENT CONTROLLED ANALGESIA SYRINGE INTRAVENOUS EVERY 5 MIN PRN
Status: DISCONTINUED | OUTPATIENT
Start: 2024-12-23 | End: 2024-12-23 | Stop reason: HOSPADM

## 2024-12-23 RX ORDER — ESTRADIOL VALERATE 40 MG/ML
40 INJECTION INTRAMUSCULAR ONCE
Status: DISCONTINUED | OUTPATIENT
Start: 2024-12-23 | End: 2024-12-23

## 2024-12-23 RX ORDER — SODIUM CHLORIDE, SODIUM LACTATE, POTASSIUM CHLORIDE, CALCIUM CHLORIDE 600; 310; 30; 20 MG/100ML; MG/100ML; MG/100ML; MG/100ML
INJECTION, SOLUTION INTRAVENOUS CONTINUOUS
Status: DISCONTINUED | OUTPATIENT
Start: 2024-12-23 | End: 2024-12-23 | Stop reason: HOSPADM

## 2024-12-23 RX ORDER — LIDOCAINE 40 MG/G
CREAM TOPICAL
Status: DISCONTINUED | OUTPATIENT
Start: 2024-12-23 | End: 2024-12-23 | Stop reason: HOSPADM

## 2024-12-23 RX ORDER — SODIUM CHLORIDE, SODIUM LACTATE, POTASSIUM CHLORIDE, AND CALCIUM CHLORIDE .6; .31; .03; .02 G/100ML; G/100ML; G/100ML; G/100ML
IRRIGANT IRRIGATION PRN
Status: DISCONTINUED | OUTPATIENT
Start: 2024-12-23 | End: 2024-12-23 | Stop reason: HOSPADM

## 2024-12-23 RX ORDER — ONDANSETRON 2 MG/ML
INJECTION INTRAMUSCULAR; INTRAVENOUS PRN
Status: DISCONTINUED | OUTPATIENT
Start: 2024-12-23 | End: 2024-12-23

## 2024-12-23 RX ORDER — NALOXONE HYDROCHLORIDE 0.4 MG/ML
0.1 INJECTION, SOLUTION INTRAMUSCULAR; INTRAVENOUS; SUBCUTANEOUS
Status: DISCONTINUED | OUTPATIENT
Start: 2024-12-23 | End: 2024-12-23 | Stop reason: HOSPADM

## 2024-12-23 RX ORDER — KETOROLAC TROMETHAMINE 30 MG/ML
INJECTION, SOLUTION INTRAMUSCULAR; INTRAVENOUS PRN
Status: DISCONTINUED | OUTPATIENT
Start: 2024-12-23 | End: 2024-12-23

## 2024-12-23 RX ORDER — IBUPROFEN 400 MG/1
800 TABLET, FILM COATED ORAL ONCE
Status: DISCONTINUED | OUTPATIENT
Start: 2024-12-23 | End: 2024-12-23 | Stop reason: HOSPADM

## 2024-12-23 RX ORDER — APREPITANT 40 MG/1
40 CAPSULE ORAL ONCE
Status: COMPLETED | OUTPATIENT
Start: 2024-12-23 | End: 2024-12-23

## 2024-12-23 RX ORDER — FENTANYL CITRATE 50 UG/ML
50 INJECTION, SOLUTION INTRAMUSCULAR; INTRAVENOUS EVERY 5 MIN PRN
Status: DISCONTINUED | OUTPATIENT
Start: 2024-12-23 | End: 2024-12-23 | Stop reason: HOSPADM

## 2024-12-23 RX ORDER — BUPIVACAINE HYDROCHLORIDE 2.5 MG/ML
INJECTION, SOLUTION INFILTRATION; PERINEURAL PRN
Status: DISCONTINUED | OUTPATIENT
Start: 2024-12-23 | End: 2024-12-23 | Stop reason: HOSPADM

## 2024-12-23 RX ORDER — ONDANSETRON 2 MG/ML
4 INJECTION INTRAMUSCULAR; INTRAVENOUS EVERY 30 MIN PRN
Status: DISCONTINUED | OUTPATIENT
Start: 2024-12-23 | End: 2024-12-23 | Stop reason: HOSPADM

## 2024-12-23 RX ORDER — BUPIVACAINE HYDROCHLORIDE 2.5 MG/ML
INJECTION, SOLUTION INFILTRATION; PERINEURAL
Status: DISCONTINUED
Start: 2024-12-23 | End: 2024-12-23 | Stop reason: HOSPADM

## 2024-12-23 RX ORDER — GLYCOPYRROLATE 0.2 MG/ML
INJECTION, SOLUTION INTRAMUSCULAR; INTRAVENOUS PRN
Status: DISCONTINUED | OUTPATIENT
Start: 2024-12-23 | End: 2024-12-23

## 2024-12-23 RX ORDER — DEXAMETHASONE SODIUM PHOSPHATE 4 MG/ML
4 INJECTION, SOLUTION INTRA-ARTICULAR; INTRALESIONAL; INTRAMUSCULAR; INTRAVENOUS; SOFT TISSUE
Status: DISCONTINUED | OUTPATIENT
Start: 2024-12-23 | End: 2024-12-23 | Stop reason: HOSPADM

## 2024-12-23 RX ORDER — DEXAMETHASONE SODIUM PHOSPHATE 10 MG/ML
INJECTION, SOLUTION INTRAMUSCULAR; INTRAVENOUS PRN
Status: DISCONTINUED | OUTPATIENT
Start: 2024-12-23 | End: 2024-12-23

## 2024-12-23 RX ORDER — FENTANYL CITRATE 50 UG/ML
INJECTION, SOLUTION INTRAMUSCULAR; INTRAVENOUS PRN
Status: DISCONTINUED | OUTPATIENT
Start: 2024-12-23 | End: 2024-12-23

## 2024-12-23 RX ORDER — PROPOFOL 10 MG/ML
INJECTION, EMULSION INTRAVENOUS CONTINUOUS PRN
Status: DISCONTINUED | OUTPATIENT
Start: 2024-12-23 | End: 2024-12-23

## 2024-12-23 RX ORDER — ACETAMINOPHEN 325 MG/1
975 TABLET ORAL ONCE
Status: COMPLETED | OUTPATIENT
Start: 2024-12-23 | End: 2024-12-23

## 2024-12-23 RX ORDER — LIDOCAINE HYDROCHLORIDE 10 MG/ML
INJECTION, SOLUTION INFILTRATION; PERINEURAL PRN
Status: DISCONTINUED | OUTPATIENT
Start: 2024-12-23 | End: 2024-12-23

## 2024-12-23 RX ADMIN — GLYCOPYRROLATE 0.2 MG: 0.2 INJECTION INTRAMUSCULAR; INTRAVENOUS at 09:35

## 2024-12-23 RX ADMIN — ROCURONIUM BROMIDE 50 MG: 10 INJECTION, SOLUTION INTRAVENOUS at 09:35

## 2024-12-23 RX ADMIN — ACETAMINOPHEN 975 MG: 325 TABLET ORAL at 08:21

## 2024-12-23 RX ADMIN — ONDANSETRON 4 MG: 2 INJECTION INTRAMUSCULAR; INTRAVENOUS at 09:35

## 2024-12-23 RX ADMIN — LIDOCAINE HYDROCHLORIDE 50 MG: 10 INJECTION, SOLUTION INFILTRATION; PERINEURAL at 09:35

## 2024-12-23 RX ADMIN — Medication 300 MG: at 10:48

## 2024-12-23 RX ADMIN — DEXAMETHASONE SODIUM PHOSPHATE 10 MG: 10 INJECTION, SOLUTION INTRAMUSCULAR; INTRAVENOUS at 09:35

## 2024-12-23 RX ADMIN — APREPITANT 40 MG: 40 CAPSULE ORAL at 08:46

## 2024-12-23 RX ADMIN — PROPOFOL 200 MCG/KG/MIN: 10 INJECTION, EMULSION INTRAVENOUS at 09:38

## 2024-12-23 RX ADMIN — DEXMEDETOMIDINE HYDROCHLORIDE 16 MCG: 100 INJECTION, SOLUTION INTRAVENOUS at 10:54

## 2024-12-23 RX ADMIN — SCOPOLAMINE 1 PATCH: 1 SYSTEM TRANSDERMAL at 08:46

## 2024-12-23 RX ADMIN — PROPOFOL 200 MG: 10 INJECTION, EMULSION INTRAVENOUS at 09:35

## 2024-12-23 RX ADMIN — ROCURONIUM BROMIDE 30 MG: 10 INJECTION, SOLUTION INTRAVENOUS at 10:13

## 2024-12-23 RX ADMIN — FENTANYL CITRATE 100 MCG: 50 INJECTION INTRAMUSCULAR; INTRAVENOUS at 09:35

## 2024-12-23 RX ADMIN — HYDROMORPHONE HYDROCHLORIDE 1 MG: 1 INJECTION, SOLUTION INTRAMUSCULAR; INTRAVENOUS; SUBCUTANEOUS at 09:53

## 2024-12-23 RX ADMIN — FENTANYL CITRATE 100 MCG: 50 INJECTION INTRAMUSCULAR; INTRAVENOUS at 10:13

## 2024-12-23 RX ADMIN — PROPOFOL 100 MG: 10 INJECTION, EMULSION INTRAVENOUS at 09:56

## 2024-12-23 RX ADMIN — SODIUM CHLORIDE, POTASSIUM CHLORIDE, SODIUM LACTATE AND CALCIUM CHLORIDE: 600; 310; 30; 20 INJECTION, SOLUTION INTRAVENOUS at 09:29

## 2024-12-23 RX ADMIN — SODIUM CHLORIDE, POTASSIUM CHLORIDE, SODIUM LACTATE AND CALCIUM CHLORIDE: 600; 310; 30; 20 INJECTION, SOLUTION INTRAVENOUS at 08:41

## 2024-12-23 RX ADMIN — KETOROLAC TROMETHAMINE 15 MG: 30 INJECTION, SOLUTION INTRAMUSCULAR at 10:52

## 2024-12-23 ASSESSMENT — ACTIVITIES OF DAILY LIVING (ADL)
ADLS_ACUITY_SCORE: 41
ADLS_ACUITY_SCORE: 15

## 2024-12-23 NOTE — ANESTHESIA POSTPROCEDURE EVALUATION
Patient: Rajni Lott    Procedure: Procedure(s):  ROBOTIC LAPAROSCOPIC BILATERAL SALPINGO-OOPHORECTOMY, LYSIS OF ADHESIONS       Anesthesia Type:  General    Note:     Postop Pain Control: Uneventful            Sign Out: Well controlled pain   PONV: No   Neuro/Psych: Uneventful            Sign Out: Acceptable/Baseline neuro status   Airway/Respiratory: Uneventful            Sign Out: Acceptable/Baseline resp. status   CV/Hemodynamics: Uneventful            Sign Out: Acceptable CV status; No obvious hypovolemia; No obvious fluid overload   Other NRE:    DID A NON-ROUTINE EVENT OCCUR?            Last vitals:  Vitals Value Taken Time   /62 12/23/24 1120   Temp 36.4  C (97.6  F) 12/23/24 1120   Pulse 77 12/23/24 1120   Resp 16 12/23/24 1120   SpO2 95 % 12/23/24 1120       Electronically Signed By: Jcarlos Crawford MD  December 23, 2024  12:17 PM

## 2024-12-23 NOTE — ANESTHESIA PREPROCEDURE EVALUATION
Anesthesia Pre-Procedure Evaluation    Patient: Rajni Lott   MRN: [id number] : 1970        Procedure : Procedure(s):  ROBOTIC LAPAROSCOPIC BILATERAL SALPINGO-OOPHORECTOMY          Past Medical History:   Diagnosis Date    Coagulation disorder (H)     DVT (deep venous thrombosis) (H)     Low back pain     MTHFR mutation     PONV (postoperative nausea and vomiting)     Thrombosis       Past Surgical History:   Procedure Laterality Date    ABDOMINAL SACROCOLPOPEXY N/A 7/3/2024    Procedure: ROBOTIC SACROCOLPOPEXY PARAVAGINAL REPAIR;  Surgeon: Kulwinder Talley MD;  Location: [facility] [place]    BIOPSY BREAST Left 2006    LIPOMA     SECTION      COLPORRHAPHY POSTERIOR N/A 7/3/2024    Procedure: POSTERIOR REPAIR;  Surgeon: Kulwinder Talley MD;  Location: [facility] [place] OR    LUMBAR DISCECTOMY Left 9/3/2015    Procedure: LEFT L4 L5 HEMILAMINECTOMY MICRODISCECTOMY;  Surgeon: Dinora Gonzalez MD;  Location: [facility] OR;  Service:     LYSIS, ADHESIONS, ROBOT-ASSISTED, LAPAROSCOPIC, USING DA REYNA XI N/A 7/3/2024    Procedure: WITH LYSIS OF ADHESIONS;  Surgeon: Kulwinder Talley MD;  Location: [facility] [place] OR    [facility] TOTAL ABDOM HYSTERECTOMY      Description: Hysterectomy;  Recorded: 2014;      Allergies   Allergen Reactions    Adhesive Tape Other (See Comments)    Chlorhexidine Hives     Pt did two showers with CHG and broke out with hives, requesting not to have preop    Latex Other (See Comments)      Social History     Tobacco Use    Smoking status: Never     Passive exposure: Never    Smokeless tobacco: Never   Substance Use Topics    Alcohol use: Yes     Comment: 2-3 a week      Wt Readings from Last 1 Encounters:   24 78 kg (172 lb)        Anesthesia Evaluation   Pt has had prior anesthetic.     History of anesthetic complications  - PONV.      ROS/MED HX  ENT/Pulmonary:       Neurologic:       Cardiovascular:       METS/Exercise Tolerance:    "  Hematologic:       Musculoskeletal:       GI/Hepatic:       Renal/Genitourinary:       Endo:       Psychiatric/Substance Use:       Infectious Disease:       Malignancy:       Other:            Physical Exam    Airway        Mallampati: II       Respiratory Devices and Support         Dental       (+) Modest Abnormalities - crowns, retainers, 1 or 2 missing teeth      Cardiovascular          Rhythm and rate: regular     Pulmonary           breath sounds clear to auscultation           OUTSIDE LABS:  CBC:   Lab Results   Component Value Date    WBC 6.1 12/23/2024    WBC 5.3 10/25/2024    HGB 13.2 12/23/2024    HGB 13.3 10/25/2024    HCT 38.9 12/23/2024    HCT 41.9 10/25/2024     12/23/2024     10/25/2024     BMP:   Lab Results   Component Value Date     10/25/2024     07/01/2019    POTASSIUM 4.6 10/25/2024    POTASSIUM 4.1 07/01/2019    CHLORIDE 101 10/25/2024    CHLORIDE 105 07/01/2019    CO2 24 10/25/2024    CO2 26 07/01/2019    BUN 21.1 (H) 10/25/2024    BUN 15 07/01/2019    CR 0.72 10/25/2024    CR 0.70 07/01/2019    GLC 90 10/25/2024    GLC 86 07/01/2019     COAGS: No results found for: \"PTT\", \"INR\", \"FIBR\"  POC:   Lab Results   Component Value Date    HCG Negative 12/23/2024     HEPATIC:   Lab Results   Component Value Date    ALBUMIN 4.3 10/25/2024    PROTTOTAL 6.8 10/25/2024    ALT 64 (H) 10/25/2024    AST 45 10/25/2024    ALKPHOS 80 10/25/2024    BILITOTAL 0.2 10/25/2024     OTHER:   Lab Results   Component Value Date    STARR 9.1 10/25/2024    TSH 1.76 10/26/2020    CRP <2.9 10/26/2020    SED 7 10/26/2020       Anesthesia Plan    ASA Status:  2       Anesthesia Type: General.   Induction: Intravenous.   Maintenance: TIVA.        Consents    Anesthesia Plan(s) and associated risks, benefits, and realistic alternatives discussed. Questions answered and patient/representative(s) expressed understanding.     - Discussed:     - Discussed with:  Patient            Postoperative Care     " "       Comments:    Other Comments: SCOP/EMTERRI Crawford MD    I have reviewed the pertinent notes and labs in the chart from the past 30 days and (re)examined the patient.  Any updates or changes from those notes are reflected in this note.                         # Overweight: Estimated body mass index is 27.35 kg/m  as calculated from the following:    Height as of this encounter: 1.689 m (5' 6.5\").    Weight as of this encounter: 78 kg (172 lb).             "

## 2024-12-23 NOTE — BRIEF OP NOTE
[facility]    Brief Operative Note    Pre-operative diagnosis: Pain in pelvis [R10.2]  Post-operative diagnosis Severe adhesions    Procedure: ROBOTIC LAPAROSCOPIC BILATERAL SALPINGO-OOPHORECTOMY, LYSIS OF ADHESIONS, Bilateral - Abdomen  Right Ureterolysis  Surgeon: Surgeons and Role:     * Kulwinder Talley MD - Primary     * Jinny Johns NP - Assisting  Anesthesia: General   Estimated Blood Loss: Minimal    Drains: None  Specimens:   ID Type Source Tests Collected by Time Destination   1 : left fallopian tube and ovary Tissue Ovary and Fallopian Tube, Left SURGICAL PATHOLOGY EXAM Kulwinder Talley MD 12/23/2024 10:18 AM    2 : right fallopian tube and ovary Tissue Ovary and Fallopian Tube, Right SURGICAL PATHOLOGY EXAM Kulwinder Talley MD 12/23/2024 10:18 AM      Findings:   None.  Complications: None.  Implants: * No implants in log *

## 2024-12-23 NOTE — PHARMACY-ADMISSION MEDICATION HISTORY
Pharmacist Admission Medication History    Admission medication history is complete. The information provided in this note is only as accurate as the sources available at the time of the update.    Information Source(s): Patient and CareEverywhere/SureScripts via in-person    Pertinent Information: n/a    Allergies reviewed with patient and updates made in EHR: yes    Medication History Completed By: Renita Rodríguez PharmD 12/23/2024 8:41 AM    PTA Med List   Medication Sig Note Last Dose/Taking    clobetasol (TEMOVATE) 0.05 % external ointment Apply topically 2 times daily as needed (APPLY TWICE DAILY AS NEEDED FOR RASH ON TRUNK AND EXTREMITIES. DO NOT APPLY TO FACE OR GROIN.)  Past Month    estradiol (VIVELLE-DOT) 0.1 MG/24HR bi-weekly patch Place 1 patch onto the skin twice a week. 12/23/2024: Removed 2 days ago due to reaction to adhesive on patch 12/21/2024    semaglutide (OZEMPIC, 0.25 OR 0.5 MG/DOSE,) 2 MG/3ML pen Inject 0.25 mg subcutaneously every 7 days.  12/15/2024    valACYclovir (VALTREX) 1000 mg tablet Take 1,000 mg by mouth 3 times daily as needed (cold sores).  Past Week

## 2024-12-23 NOTE — ANESTHESIA PROCEDURE NOTES
Airway       Patient location during procedure: OR       Procedure Start/Stop Times: 12/23/2024 9:36 AM  Staff -        Performed By: CRNA  Consent for Airway        Urgency: elective  Indications and Patient Condition       Indications for airway management: roxana-procedural       Induction type:intravenous       Mask difficulty assessment: 1 - vent by mask    Final Airway Details       Final airway type: endotracheal airway       Successful airway: ETT - single  Endotracheal Airway Details        ETT size (mm): 7.5       Cuffed: yes       Successful intubation technique: direct laryngoscopy       DL Blade Type: Monreal 2       Grade View of Cords: 1       Adjucts: stylet       Position: Right       Measured from: lips       Secured at (cm): 22       Bite block used: None    Post intubation assessment        Placement verified by: capnometry, equal breath sounds and chest rise        Number of attempts at approach: 1       Number of other approaches attempted: 0       Secured with: tape       Ease of procedure: easy       Dentition: Intact and Unchanged    Medication(s) Administered   Medication Administration Time: 12/23/2024 9:36 AM

## 2024-12-23 NOTE — DISCHARGE INSTRUCTIONS
You have received 975 mg of Acetaminophen (Tylenol) at 8:20 am . Please do not take an additional dose of Tylenol until after 2:20 pm      Do not exceed 4,000 mg of acetaminophen during a 24 hour period and keep in mind that acetaminophen can also be found in many over-the-counter cold medications as well as narcotics that may be given for pain.     You received an IV medication today called Toradol. You received this medication at 10:50 am . This is a NSAID. Therefore, do not take any NSAIDS (Ibuprofen products, Advil, Motrin, etc) until 4:50 pm .      Remove scopolamine patch behind ear 24-72 hours after surgery. Wash hands WELL and do not touch your face.       If you have any questions or concerns regarding your procedure, please contact Dr. Talley, his office number is [phone].

## 2024-12-23 NOTE — PROGRESS NOTES
Awaiting call from Dr. Talley regarding medication order he has written for pt to receive IM estrogen here in phase 2 care area. This medication  is not available at [facility] pharmacy.  Pt informed .     Pt and family verbalize good understanding of discharge teach and follow up with MD.   VSS,Surgical incision CDI. D/C criteria met. Pt verbalizes readiness to go home. Pt has ambulated and voided. Rates discomfort 1/10 at this time.     Pt has texted Dr. Talley directly about her estrogen medication.  Awaiting order.     [place] [place] 12/23/2024 12:36 PM

## 2024-12-23 NOTE — ANESTHESIA CARE TRANSFER NOTE
Patient: Rajni Lott    Procedure: Procedure(s):  ROBOTIC LAPAROSCOPIC BILATERAL SALPINGO-OOPHORECTOMY, LYSIS OF ADHESIONS       Diagnosis: Pain in pelvis [R10.2]  Diagnosis Additional Information: No value filed.    Anesthesia Type:   General     Note:    Oropharynx: oropharynx clear of all foreign objects  Level of Consciousness: drowsy  Oxygen Supplementation: face mask  Level of Supplemental Oxygen (L/min / FiO2): 6  Independent Airway: airway patency satisfactory and stable  Dentition: dentition unchanged  Vital Signs Stable: post-procedure vital signs reviewed and stable  Report to RN Given: handoff report given  Patient transferred to: PACU    Handoff Report: Identifed the Patient, Identified the Reponsible Provider, Reviewed the pertinent medical history, Discussed the surgical course, Reviewed Intra-OP anesthesia mangement and issues during anesthesia, Set expectations for post-procedure period and Allowed opportunity for questions and acknowledgement of understanding      Vitals:  Vitals Value Taken Time   /61 12/23/24 1053   Temp 36.2  C (97.16  F) 12/23/24 1055   Pulse 80 12/23/24 1055   Resp 0 12/23/24 1055   SpO2 100 % 12/23/24 1055   Vitals shown include unfiled device data.    Electronically Signed By: RYLEY Luis CRNA  December 23, 2024  10:57 AM

## 2024-12-28 NOTE — OP NOTE
Patient Name: Rajni Lott  Patient MRN:[id number]  Patient : 1970    [facility] [place]    [place]ative Report   Date: 2024    Pre-operative diagnosis: Pelvic pain      Post-operative diagnosis: Severe pelvic adhesions      Procedure: Robotic laparoscopy with bilateral salpingo-oophorectomy, lysis of adhesions, right ureterolysis.       Attending Surgeon: Kulwinder Talley MD  Urogynecology and Reconstructive Pelvic Surgery        Surgical Assist: Jinny Johns CNP        Anesthesia: General      Estimated Blood Loss: Minimal      Drains: None      Specimens: Right and left tubes and ovaries, submitted separately      Findings: The patient has had a prior sacrocolpopexy which appeared intact cervix was normal as was the graft.  Both adnexa were severely scarred into the pelvic sidewall.  No excrescences or papillations were noted on the ovaries or on the peritoneal surface.  Upper abdomen was normal.  No other abnormalities were noted.      Complications: None      Implants: None      Immediate postoperative plan: Stable to home      Indication for procedure: This is a 54-year-old female status post sacrocolpopexy and supracervical hysterectomy who presented with severe bilateral pelvic pain she was given informed consent for the above the risk benefits and alternatives were discussed she expressed understanding and wished to proceed.      Details procedure and intraoperative findings: A briefing was held preoperatively and the patient and the procedure were agreed upon.  The patient was then taken to the operating room and after induction of general anesthesia was prepped and draped in the dorsolithotomy position.  A Gomez was placed and attention was directed to the abdomen where a supraumbilical incision was made.  A Veress needle was inserted and the abdomen was insufflated with 3 L of CO2.  An 8 mm trocar trocar sheath was inserted and a laparoscope was introduced.   2 additional incisions were then made to the right and 1 to the left.  Trocars were placed under direct visualization the robot was docked the patient was placed in steep Trendelenburg.    The left adnexa was addressed and the adhesions were taken down to free up the left tube and ovary the ureter could easily be identified.  A vessel sealer was then used to come across the infundibulopelvic and broad ligaments to free the adnexa.  There were bowel adhesions to the into the cul-de-sac and these were gently taken down using a scissors.  Attention was then directed to the right adnexa where the severe adhesions were more severe.  The peritoneum over the pelvic brim was opened the ureter was identified and traced down and retracted laterally.  In this manner the right adnexa was elevated freed up from its right pelvic sidewall adhesions.  Once this was accomplished a vessel sealer was used to come across the infundibulopelvic ligament and across the broad ligament freeing the adnexa.    At this point the robot was undocked.  Each ovary was separately bagged and tagged and submitted for pathologic exam.  The adhesions were then closed with Vicryl Monocryl and Dermabond.    Sponge instrument counts were correct patient tolerated the procedure well.  Patient was taken to the recovery room in good condition.    A debriefing was held and the patient the procedure and the specimens were agreed upon.    Kulwinder Talley MD  Urogynecology and Reconstructive Pelvic Surgery        CC:Kulwinder Talley MD  Female Pelvic Medicine and Reconstructive Surgery

## 2024-12-31 LAB
PATH REPORT.COMMENTS IMP SPEC: NORMAL
PATH REPORT.COMMENTS IMP SPEC: NORMAL
PATH REPORT.FINAL DX SPEC: NORMAL
PATH REPORT.GROSS SPEC: NORMAL
PATH REPORT.MICROSCOPIC SPEC OTHER STN: NORMAL
PATH REPORT.RELEVANT HX SPEC: NORMAL
PHOTO IMAGE: NORMAL

## 2024-12-31 PROCEDURE — 88307 TISSUE EXAM BY PATHOLOGIST: CPT | Mod: 26 | Performed by: PATHOLOGY

## 2024-12-31 PROCEDURE — 88305 TISSUE EXAM BY PATHOLOGIST: CPT | Mod: 26 | Performed by: PATHOLOGY

## 2025-04-20 ENCOUNTER — HEALTH MAINTENANCE LETTER (OUTPATIENT)
Age: 55
End: 2025-04-20

## 2025-04-21 ENCOUNTER — LAB REQUISITION (OUTPATIENT)
Dept: LAB | Facility: CLINIC | Age: 55
End: 2025-04-21
Payer: COMMERCIAL

## 2025-04-21 DIAGNOSIS — Z12.4 ENCOUNTER FOR SCREENING FOR MALIGNANT NEOPLASM OF CERVIX: ICD-10-CM

## 2025-04-21 PROCEDURE — G0145 SCR C/V CYTO,THINLAYER,RESCR: HCPCS | Mod: ORL | Performed by: OBSTETRICS & GYNECOLOGY

## 2025-04-21 PROCEDURE — 87624 HPV HI-RISK TYP POOLED RSLT: CPT | Mod: ORL | Performed by: OBSTETRICS & GYNECOLOGY

## 2025-04-22 ENCOUNTER — PATIENT OUTREACH (OUTPATIENT)
Dept: CARE COORDINATION | Facility: CLINIC | Age: 55
End: 2025-04-22
Payer: COMMERCIAL

## 2025-04-22 LAB
HPV HR 12 DNA CVX QL NAA+PROBE: NEGATIVE
HPV16 DNA CVX QL NAA+PROBE: NEGATIVE
HPV18 DNA CVX QL NAA+PROBE: NEGATIVE
HUMAN PAPILLOMA VIRUS FINAL DIAGNOSIS: NORMAL

## 2025-04-24 LAB
BKR AP ASSOCIATED HPV REPORT: NORMAL
BKR LAB AP GYN ADEQUACY: NORMAL
BKR LAB AP GYN INTERPRETATION: NORMAL
BKR LAB AP LMP: NORMAL
BKR LAB AP PREVIOUS ABNL DX: NORMAL
BKR LAB AP PREVIOUS ABNORMAL: NORMAL
PATH REPORT.COMMENTS IMP SPEC: NORMAL
PATH REPORT.COMMENTS IMP SPEC: NORMAL
PATH REPORT.RELEVANT HX SPEC: NORMAL

## (undated) DEVICE — ENDO SHEARS RENEW LAP ENDOCUT SCISSOR TIP 16.5MM 3142

## (undated) DEVICE — DAVINCI XI OBTURATOR BLADELESS 8MM 470359

## (undated) DEVICE — GOWN XXL 9575

## (undated) DEVICE — SYR 50ML SLIP TIP W/O NDL 309654

## (undated) DEVICE — TUBING FILTER TRI-LUMEN AIRSEAL ASC-EVAC1

## (undated) DEVICE — DAVINCI XI DRAPE COLUMN 470341

## (undated) DEVICE — SUTURE VICRYL+ 0 36IN CT-1 UND VCP946H

## (undated) DEVICE — SU DERMABOND ADVANCED .7ML DNX12

## (undated) DEVICE — ENDO APPLICATOR SURGIFLO PLASMA COBLATION MS1995

## (undated) DEVICE — RX SURGIFLO HEMOSTATIC MATRIX W/THROMBIN 8ML 2994

## (undated) DEVICE — PREP CHLORAPREP 26ML TINTED HI-LITE ORANGE 930815

## (undated) DEVICE — SYR 20ML LL W/O NDL 302830

## (undated) DEVICE — BLADE KNIFE SURG 15 371115

## (undated) DEVICE — ELECTRODE PATIENT RETURN ADULT L10 FT 2 PLATE CORD 0855C

## (undated) DEVICE — SU WND CLOSURE V-LOC 90 SZ 2-0 9" GS-22 VLOCM2145

## (undated) DEVICE — DAVINCI HOT SHEARS TIP COVER  400180

## (undated) DEVICE — MAT FLOOR SURGICAL 40X38 0702140238

## (undated) DEVICE — PREP POVIDONE-IODINE 7.5% SCRUB 4OZ BOTTLE MDS093945

## (undated) DEVICE — PACK TRENGUARD 450 PROCEDURAL 2065406

## (undated) DEVICE — PREP POVIDONE-IODINE 10% SOLUTION 4OZ BOTTLE MDS093944

## (undated) DEVICE — CATH FOLEY 16FR 5ML LUBRICATH LATEX 0165L16

## (undated) DEVICE — SU VICRYL+ 0 8-18 CT1/CR UND VCP840D

## (undated) DEVICE — CUSTOM PACK DA VINCI GYN SMA5BDVHEA

## (undated) DEVICE — SYR 50ML CATH TIP W/O NDL 309620

## (undated) DEVICE — BRIEF STRETCH XL MPS40

## (undated) DEVICE — ADAPTER DRAPE ALLY AU-AD

## (undated) DEVICE — SUCTION MANIFOLD NEPTUNE 2 SYS 1 PORT 702-025-000

## (undated) DEVICE — SU VICRYL+ 0 27 UR6 VLT VCP603H

## (undated) DEVICE — DRAPE SHEET REV FOLD 3/4 9349

## (undated) DEVICE — LUBRICANT INST ELECTROLUBE EL101

## (undated) DEVICE — GLOVE SURG PI ULTRA TOUCH M SZ 7 LF 42670

## (undated) DEVICE — SOL WATER IRRIG 1000ML BOTTLE 2F7114

## (undated) DEVICE — ENDO OBTURATOR ACCESS PORT BLADELESS 8X100MM IAS8-100LP

## (undated) DEVICE — Device

## (undated) DEVICE — NDL INSUFFLATION 13GA 120MM C2201

## (undated) DEVICE — SUCTION STRYKERFLOW II 250-070-500

## (undated) DEVICE — DAVINCI XI SEAL UNIVERSAL 5-12MM 470500

## (undated) DEVICE — DAVINCI XI HANDPIECE ESU VESSEL SEALER 8MM EXT 480422

## (undated) DEVICE — DAVINCI XI DRAPE ARM 470015

## (undated) DEVICE — BARRIER SEPRAFILM 5X6" SINGLE SHEET 4301-02

## (undated) DEVICE — ENDO POUCH UNIV RETRIEVAL SYSTEM INZII 10MM CD001

## (undated) DEVICE — SYR 30ML LL W/O NDL 302832

## (undated) DEVICE — GLOVE PI ULTRATCH M LF SZ 6.5 PF CUFF TEXT STRL LF 42665

## (undated) DEVICE — SUTURE VICRYL+ 3-0 18IN PS-2 UND VCP497H

## (undated) DEVICE — PAD POS XL 1X20X40IN PINK PIGAZZI

## (undated) DEVICE — DRAPE SHEET TABLE COVER KC 42301*

## (undated) DEVICE — SUTURE VICRYL+ 0 27IN CT-1 UND VCP260H

## (undated) DEVICE — SUTURE GORTEX 2N02A

## (undated) DEVICE — PROTECTOR ARM STANDARD ONE STEP

## (undated) DEVICE — GLOVE SURG PI ULTRA TOUCH M SZ 7-1/2 LF

## (undated) DEVICE — POSITIONING KIT THE PINK PAD XL 40X20X1IN 40583 (COI)

## (undated) DEVICE — ENDO TROCAR FIRST ENTRY KII FIOS Z-THRD 05X100MM CTF03

## (undated) DEVICE — SYR 10ML FINGER CONTROL W/O NDL 309695

## (undated) DEVICE — NEEDLE HYPO 18X1-1/2 SAFETY 305918

## (undated) DEVICE — GLOVE UNDER INDICATOR PI SZ 6.5 LF 41665

## (undated) DEVICE — SU MONOCRYL 3-0 PS-2 18" UND Y497G

## (undated) DEVICE — NEEDLE HYPO MAGELLAN SAFETY 22GA 1 1/2IN 8881850215

## (undated) DEVICE — GOWN XLG DISP 9545

## (undated) DEVICE — BLADE KNIFE SURG 11 371111

## (undated) DEVICE — SUTURE MONOCRYL 3-0 18 PS2 UND MCP497G

## (undated) DEVICE — ENDO TROCAR FIRST ENTRY KII FIOS Z-THRD 11X100MM CTF33

## (undated) DEVICE — PROTECTOR ARM STANDARD ONE STEP 40433 (COI)

## (undated) DEVICE — ANTIFOG SOLUTION SEE SHARP 150M TROCAR SWABS 30978 (COI)

## (undated) DEVICE — ANTIFOG SOLUTION SEE SHARP 150M TROCAR SWABS 30978

## (undated) DEVICE — DEVICE CLOSURE V-LOC 2-0 V-20 TAPER POINT 6IN VLOCN0605

## (undated) DEVICE — GLOVE UNDER INDICATOR PI SZ 7.0 LF 41670

## (undated) RX ORDER — BUPIVACAINE HYDROCHLORIDE 2.5 MG/ML
INJECTION, SOLUTION EPIDURAL; INFILTRATION; INTRACAUDAL
Status: DISPENSED
Start: 2024-07-03

## (undated) RX ORDER — DEXAMETHASONE SODIUM PHOSPHATE 10 MG/ML
INJECTION, SOLUTION INTRAMUSCULAR; INTRAVENOUS
Status: DISPENSED
Start: 2024-07-03

## (undated) RX ORDER — LIDOCAINE HYDROCHLORIDE AND EPINEPHRINE 10; 10 MG/ML; UG/ML
INJECTION, SOLUTION INFILTRATION; PERINEURAL
Status: DISPENSED
Start: 2024-07-03

## (undated) RX ORDER — KETOROLAC TROMETHAMINE 30 MG/ML
INJECTION, SOLUTION INTRAMUSCULAR; INTRAVENOUS
Status: DISPENSED
Start: 2024-12-23

## (undated) RX ORDER — PROPOFOL 10 MG/ML
INJECTION, EMULSION INTRAVENOUS
Status: DISPENSED
Start: 2024-07-03

## (undated) RX ORDER — GLYCOPYRROLATE 0.2 MG/ML
INJECTION, SOLUTION INTRAMUSCULAR; INTRAVENOUS
Status: DISPENSED
Start: 2024-12-23

## (undated) RX ORDER — PROPOFOL 10 MG/ML
INJECTION, EMULSION INTRAVENOUS
Status: DISPENSED
Start: 2024-12-23

## (undated) RX ORDER — FENTANYL CITRATE 50 UG/ML
INJECTION, SOLUTION INTRAMUSCULAR; INTRAVENOUS
Status: DISPENSED
Start: 2024-12-23

## (undated) RX ORDER — ONDANSETRON 2 MG/ML
INJECTION INTRAMUSCULAR; INTRAVENOUS
Status: DISPENSED
Start: 2024-07-03

## (undated) RX ORDER — LIDOCAINE HYDROCHLORIDE 10 MG/ML
INJECTION, SOLUTION EPIDURAL; INFILTRATION; INTRACAUDAL; PERINEURAL
Status: DISPENSED
Start: 2024-12-23

## (undated) RX ORDER — DEXAMETHASONE SODIUM PHOSPHATE 10 MG/ML
INJECTION, EMULSION INTRAMUSCULAR; INTRAVENOUS
Status: DISPENSED
Start: 2024-12-23

## (undated) RX ORDER — ONDANSETRON 2 MG/ML
INJECTION INTRAMUSCULAR; INTRAVENOUS
Status: DISPENSED
Start: 2024-12-23

## (undated) RX ORDER — LIDOCAINE HYDROCHLORIDE 10 MG/ML
INJECTION, SOLUTION EPIDURAL; INFILTRATION; INTRACAUDAL; PERINEURAL
Status: DISPENSED
Start: 2024-07-03

## (undated) RX ORDER — FENTANYL CITRATE 50 UG/ML
INJECTION, SOLUTION INTRAMUSCULAR; INTRAVENOUS
Status: DISPENSED
Start: 2024-07-03